# Patient Record
Sex: MALE | Race: WHITE | Employment: FULL TIME | URBAN - METROPOLITAN AREA
[De-identification: names, ages, dates, MRNs, and addresses within clinical notes are randomized per-mention and may not be internally consistent; named-entity substitution may affect disease eponyms.]

---

## 2018-01-07 ENCOUNTER — APPOINTMENT (OUTPATIENT)
Dept: GENERAL RADIOLOGY | Age: 78
DRG: 470 | End: 2018-01-07
Attending: PHYSICIAN ASSISTANT
Payer: COMMERCIAL

## 2018-01-07 ENCOUNTER — HOSPITAL ENCOUNTER (INPATIENT)
Age: 78
LOS: 4 days | Discharge: NURSING, REHAB OR LONG-TERM CARE | DRG: 470 | End: 2018-01-11
Attending: EMERGENCY MEDICINE | Admitting: FAMILY MEDICINE
Payer: COMMERCIAL
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
## 2018-01-07 DIAGNOSIS — S72.001A CLOSED FRACTURE OF NECK OF RIGHT FEMUR, INITIAL ENCOUNTER (HCC): Primary | ICD-10-CM

## 2018-01-07 PROBLEM — F17.200 CURRENT SMOKER: Status: ACTIVE | Noted: 2018-01-07

## 2018-01-07 LAB
ABO + RH BLD: NORMAL
ANION GAP SERPL CALC-SCNC: 7 MMOL/L (ref 3–18)
APPEARANCE UR: CLEAR
BACTERIA URNS QL MICRO: ABNORMAL /HPF
BASOPHILS # BLD: 0 K/UL (ref 0–0.06)
BASOPHILS NFR BLD: 0 % (ref 0–2)
BILIRUB UR QL: NEGATIVE
BLOOD GROUP ANTIBODIES SERPL: NORMAL
BUN SERPL-MCNC: 22 MG/DL (ref 7–18)
BUN/CREAT SERPL: 26 (ref 12–20)
CALCIUM SERPL-MCNC: 9 MG/DL (ref 8.5–10.1)
CHLORIDE SERPL-SCNC: 108 MMOL/L (ref 100–108)
CO2 SERPL-SCNC: 25 MMOL/L (ref 21–32)
COLOR UR: YELLOW
CREAT SERPL-MCNC: 0.84 MG/DL (ref 0.6–1.3)
DIFFERENTIAL METHOD BLD: ABNORMAL
EOSINOPHIL # BLD: 0.1 K/UL (ref 0–0.4)
EOSINOPHIL NFR BLD: 1 % (ref 0–5)
EPITH CASTS URNS QL MICRO: ABNORMAL /LPF (ref 0–5)
ERYTHROCYTE [DISTWIDTH] IN BLOOD BY AUTOMATED COUNT: 14.6 % (ref 11.6–14.5)
GLUCOSE BLD STRIP.AUTO-MCNC: 94 MG/DL (ref 70–110)
GLUCOSE SERPL-MCNC: 100 MG/DL (ref 74–99)
GLUCOSE UR STRIP.AUTO-MCNC: NEGATIVE MG/DL
HCT VFR BLD AUTO: 43.1 % (ref 36–48)
HGB BLD-MCNC: 14.6 G/DL (ref 13–16)
HGB UR QL STRIP: ABNORMAL
INR PPP: 0.9 (ref 0.8–1.2)
KETONES UR QL STRIP.AUTO: ABNORMAL MG/DL
LEUKOCYTE ESTERASE UR QL STRIP.AUTO: ABNORMAL
LYMPHOCYTES # BLD: 1.2 K/UL (ref 0.9–3.6)
LYMPHOCYTES NFR BLD: 13 % (ref 21–52)
MCH RBC QN AUTO: 30.7 PG (ref 24–34)
MCHC RBC AUTO-ENTMCNC: 33.9 G/DL (ref 31–37)
MCV RBC AUTO: 90.7 FL (ref 74–97)
MONOCYTES # BLD: 0.5 K/UL (ref 0.05–1.2)
MONOCYTES NFR BLD: 6 % (ref 3–10)
NEUTS SEG # BLD: 7.5 K/UL (ref 1.8–8)
NEUTS SEG NFR BLD: 80 % (ref 40–73)
NITRITE UR QL STRIP.AUTO: NEGATIVE
PH UR STRIP: 6 [PH] (ref 5–8)
PLATELET # BLD AUTO: 255 K/UL (ref 135–420)
PMV BLD AUTO: 10.8 FL (ref 9.2–11.8)
POTASSIUM SERPL-SCNC: 4.8 MMOL/L (ref 3.5–5.5)
PROT UR STRIP-MCNC: NEGATIVE MG/DL
PROTHROMBIN TIME: 12 SEC (ref 11.5–15.2)
RBC # BLD AUTO: 4.75 M/UL (ref 4.7–5.5)
RBC #/AREA URNS HPF: ABNORMAL /HPF (ref 0–5)
SODIUM SERPL-SCNC: 140 MMOL/L (ref 136–145)
SP GR UR REFRACTOMETRY: 1.02 (ref 1–1.03)
SPECIMEN EXP DATE BLD: NORMAL
UROBILINOGEN UR QL STRIP.AUTO: 0.2 EU/DL (ref 0.2–1)
WBC # BLD AUTO: 9.4 K/UL (ref 4.6–13.2)
WBC URNS QL MICRO: ABNORMAL /HPF (ref 0–4)

## 2018-01-07 PROCEDURE — 94761 N-INVAS EAR/PLS OXIMETRY MLT: CPT

## 2018-01-07 PROCEDURE — 74011000250 HC RX REV CODE- 250: Performed by: FAMILY MEDICINE

## 2018-01-07 PROCEDURE — 86901 BLOOD TYPING SEROLOGIC RH(D): CPT | Performed by: PHYSICIAN ASSISTANT

## 2018-01-07 PROCEDURE — 65270000029 HC RM PRIVATE

## 2018-01-07 PROCEDURE — 81001 URINALYSIS AUTO W/SCOPE: CPT | Performed by: FAMILY MEDICINE

## 2018-01-07 PROCEDURE — 85025 COMPLETE CBC W/AUTO DIFF WBC: CPT | Performed by: PHYSICIAN ASSISTANT

## 2018-01-07 PROCEDURE — 74011250636 HC RX REV CODE- 250/636: Performed by: FAMILY MEDICINE

## 2018-01-07 PROCEDURE — 73502 X-RAY EXAM HIP UNI 2-3 VIEWS: CPT

## 2018-01-07 PROCEDURE — 77030032490 HC SLV COMPR SCD KNE COVD -B

## 2018-01-07 PROCEDURE — 77030034849

## 2018-01-07 PROCEDURE — 80048 BASIC METABOLIC PNL TOTAL CA: CPT | Performed by: PHYSICIAN ASSISTANT

## 2018-01-07 PROCEDURE — C9113 INJ PANTOPRAZOLE SODIUM, VIA: HCPCS | Performed by: FAMILY MEDICINE

## 2018-01-07 PROCEDURE — 71045 X-RAY EXAM CHEST 1 VIEW: CPT

## 2018-01-07 PROCEDURE — 77030020263 HC SOL INJ SOD CL0.9% LFCR 1000ML

## 2018-01-07 PROCEDURE — 82962 GLUCOSE BLOOD TEST: CPT

## 2018-01-07 PROCEDURE — 85610 PROTHROMBIN TIME: CPT | Performed by: FAMILY MEDICINE

## 2018-01-07 PROCEDURE — 93005 ELECTROCARDIOGRAM TRACING: CPT

## 2018-01-07 PROCEDURE — 99285 EMERGENCY DEPT VISIT HI MDM: CPT

## 2018-01-07 RX ORDER — MORPHINE SULFATE 10 MG/ML
4 INJECTION, SOLUTION INTRAMUSCULAR; INTRAVENOUS
Status: DISCONTINUED | OUTPATIENT
Start: 2018-01-07 | End: 2018-01-08

## 2018-01-07 RX ORDER — NALOXONE HYDROCHLORIDE 0.4 MG/ML
0.4 INJECTION, SOLUTION INTRAMUSCULAR; INTRAVENOUS; SUBCUTANEOUS AS NEEDED
Status: DISCONTINUED | OUTPATIENT
Start: 2018-01-07 | End: 2018-01-11 | Stop reason: HOSPADM

## 2018-01-07 RX ORDER — SODIUM CHLORIDE 9 MG/ML
100 INJECTION, SOLUTION INTRAVENOUS CONTINUOUS
Status: DISCONTINUED | OUTPATIENT
Start: 2018-01-07 | End: 2018-01-11 | Stop reason: HOSPADM

## 2018-01-07 RX ADMIN — MORPHINE SULFATE 4 MG: 10 INJECTION INTRAMUSCULAR; INTRAVENOUS; SUBCUTANEOUS at 21:41

## 2018-01-07 RX ADMIN — SODIUM CHLORIDE 40 MG: 9 INJECTION, SOLUTION INTRAMUSCULAR; INTRAVENOUS; SUBCUTANEOUS at 23:27

## 2018-01-07 RX ADMIN — SODIUM CHLORIDE 100 ML/HR: 900 INJECTION, SOLUTION INTRAVENOUS at 21:32

## 2018-01-07 NOTE — IP AVS SNAPSHOT
Romana Halo 
 
 
 [address] Patient: Tan Jiménez MRN: [id number] About your hospitalization You were admitted on:  January 7, 2018 You last received care in the:  [facility] [facility] You were discharged on:  January 11, 2018 Why you were hospitalized Your primary diagnosis was:  Hip Fracture, Right (Hcc) Your diagnoses also included:  Current Smoker Follow-up Information Follow up With Details Comments Contact Info Sunil Hartley MD On 2/6/2018 9am [facility] [address] [id number] 
[phone] Yash Wick MD In 1 month for follow up, sutures out in 10-14 days [facility] [address] [facility] [address] 
[phone] Your Scheduled Appointments Tuesday February 06, 2018  9:00 AM EST New Patient with Sunil Hartley MD  
[facility] [address] [id number]  
[phone] Discharge Orders None A check jennifer indicates which time of day the medication should be taken. My Medications START taking these medications Instructions Each Dose to Equal  
 Morning Noon Evening Bedtime  
 enoxaparin 40 mg/0.4 mL Commonly known as:  LOVENOX Your last dose was: Your next dose is: 0.4 mL by SubCUTAneous route daily. 40 mg  
    
   
   
   
  
 nicotine 14 mg/24 hr patch Commonly known as:  Parag Hashimoto Start taking on:  1/12/2018 Your last dose was: Your next dose is:    
   
   
 1 Patch by TransDERmal route daily for 30 days. 1 Patch  
    
   
   
   
  
 oxyCODONE-acetaminophen 5-325 mg per tablet Commonly known as:  PERCOCET Your last dose was: Your next dose is: Take 1-2 Tabs by mouth every four (4) hours as needed. Max Daily Amount: 12 Tabs. 1-2 Tab Where to Get Your Medications Information on where to get these meds will be given to you by the nurse or doctor. ! Ask your nurse or doctor about these medications  
  enoxaparin 40 mg/0.4 mL  
 nicotine 14 mg/24 hr patch  
 oxyCODONE-acetaminophen 5-325 mg per tablet Discharge Instructions Surgery to Repair a Hip Fracture: What to Expect at Home Your Recovery Surgery for a hip fracture repairs a broken hip bone. When you leave the hospital after surgery, you will probably be walking with crutches or a walker. You may be able to climb a few stairs and get in and out of bed and chairs. But you will need someone to help you at home for the next few weeks or until you have more energy and can move around better. If there is no one to help you at home, you may go to a rehabilitation center or long-term care center. You will go home with a bandage and stitches or staples. You can remove the bandage when your doctor tells you to. Your doctor will remove your stitches or staples 10 days to 3 weeks after your surgery. You may still have some mild pain, and the area may be swollen for 3 to 4 months after surgery. Your doctor will give you medicine for the pain. You will continue the rehabilitation program (rehab) you started in the hospital. The better you do with your rehab exercises, the quicker you will get your strength and movement back. Most people are able to return to work 4 weeks to 4 months after surgery. But it may take 6 months to 1 year for you to fully recover. Some people, especially older people, are never able to move quite as well as they used to. You heal best when you take good care of yourself. Eat a variety of healthy foods, and don't smoke. This care sheet gives you a general idea about how long it will take for you to recover. But each person recovers at a different pace. Follow the steps below to get better as quickly as possible. How can you care for yourself at home? Activity ? · Rest when you feel tired. You may take a nap, but do not stay in bed all day. ? · Work with your physical therapist to learn the best way to exercise. You may be able to take frequent, short walks using crutches or a walker. You will probably have to use crutches or a walker for at least 4 to 6 weeks. After that, you may need to use a cane to help you walk. ? · Do not sit for longer than 30 to 45 minutes at a time. When you sit, use chairs with arms, and do not sit in low chairs. ? · Sleep on your back with your legs slightly apart or on your side with a pillow between your knees for about 6 weeks or as your doctor tells you. Do not sleep on your stomach or affected hip. ? · You may need to take sponge baths until your stitches or staples have been removed. You will probably be able to shower 24 hours after they are removed. Ask your doctor when it is okay to bathe or shower. ? · Ask your doctor when you can drive again. ? · Most people are able to return to work 4 weeks to 4 months after surgery. ? · Ask your doctor when it is okay for you to have sex. ? · Do not lift anything that would make you strain. This may include heavy grocery bags and milk containers, a heavy briefcase or backpack, cat litter or dog food bags, a vacuum , or a child. Diet ? · By the time you leave the hospital, you will probably be eating your normal diet. If your stomach is upset, try bland, low-fat foods like plain rice, broiled chicken, toast, and yogurt. Your doctor may recommend that you take iron and vitamin supplements. ? · Continue to drink plenty of fluids. ? · Eat healthy foods, and watch your portion sizes. Try to stay at your ideal weight. Too much weight puts more stress on your hip joint. ? · You may notice that your bowel movements are not regular right after your surgery. This is common.  Try to avoid constipation and straining with bowel movements. You may want to take a fiber supplement every day. If you have not had a bowel movement after a couple of days, ask your doctor about taking a mild laxative. ? · Your doctor may want you to take calcium supplements and eat foods high in calcium, such as milk, cheese, ice cream, and salmon with bones. These help stop bone loss. Orange juice and soy milk with added calcium are also good choices. Medicines ? · Your doctor will tell you if and when you can restart your medicines. He or she will also give you instructions about taking any new medicines. ? · If you take blood thinners, such as warfarin (Coumadin), clopidogrel (Plavix), or aspirin, be sure to talk to your doctor. He or she will tell you if and when to start taking those medicines again. Make sure that you understand exactly what your doctor wants you to do.  
? · Your doctor may give you a blood-thinning medicine to prevent blood clots. If you take a blood thinner, be sure you get instructions about how to take your medicine safely. Blood thinners can cause serious bleeding problems. This medicine could be in pill form or as a shot (injection). If a shot is necessary, your doctor will tell you how to do this. ? · Be safe with medicines. Take pain medicines exactly as directed. ¨ If the doctor gave you a prescription medicine for pain, take it as prescribed. ¨ If you are not taking a prescription pain medicine, ask your doctor if you can take an over-the-counter medicine. ? · If you think your pain medicine is making you sick to your stomach: 
¨ Take your medicine after meals (unless your doctor has told you not to). ¨ Ask your doctor for a different pain medicine. ? · If your doctor prescribed antibiotics, take them as directed. Do not stop taking them just because you feel better. You need to take the full course of antibiotics.   
? · Your doctor may also prescribe medicines or calcium supplements to make your bones stronger. Incision care ? · You will have a bandage over the cut (incision) and staples or stitches. If there is no drainage, most doctors will let you take the bandage off in a few days. ? · Your doctor will remove the staples or stitches 10 days to 3 weeks after the surgery and replace them with strips of tape. Leave the tape on for a week or until it falls off. Exercise ? · Your rehab program will include a number of exercises to do. Always do them as your therapist tells you. ? · Do not do any vigorous exercise for 12 weeks or until your doctor tells you it is okay. Ice and elevation ? · For pain, put ice or a cold pack on the area for 10 to 20 minutes at a time. Put a thin cloth between the ice and your skin. ? · Your ankle may swell for about 3 months. Prop up your ankle when you ice it or anytime you sit or lie down. Try to keep it above the level of your heart. This will help reduce swelling. Other instructions ? · Continue to wear your support stockings as your doctor says. These help to prevent blood clots. The length of time that you will have to wear them depends on your activity level and the amount of swelling you have. Most people wear these stockings for 4 to 6 weeks after surgery. ?Preventing falls is also very important. To prevent falls: 
? · Arrange furniture so that you will not trip on it. ? · Get rid of throw rugs, and move electrical cords out of the way. ? · Walk only in areas with plenty of light. ? · Put grab bars in showers and bathtubs. ? · Avoid icy or snowy sidewalks. ? · Wear shoes with sturdy, flat soles. Follow-up care is a key part of your treatment and safety. Be sure to make and go to all appointments, and call your doctor if you are having problems. It's also a good idea to know your test results and keep a list of the medicines you take. When should you call for help? Call 911 anytime you think you may need emergency care. For example, call if: 
? · You passed out (lost consciousness). ? · You have severe trouble breathing. ? · You have sudden chest pain and shortness of breath, or you cough up blood. ?Call your doctor now or seek immediate medical care if: 
? · Your leg or foot is cool or pale or changes color. ? · You cannot feel or move your leg. ? · You have signs of a blood clot, such as: 
¨ Pain in your calf, back of the knee, thigh, or groin. ¨ Redness and swelling in your leg or groin. ? · Your incision comes open and begins to bleed, or the bleeding increases. ? · You feel like your heart is racing or beating irregularly. ? · You have signs of infection, such as: 
¨ Increased pain, swelling, warmth, or redness. ¨ Red streaks leading from the incision. ¨ Pus draining from the incision. ¨ A fever. ? Watch closely for any changes in your health, and be sure to contact your doctor if: 
? · You do not have a bowel movement after taking a laxative. ? · You do not get better as expected. Where can you learn more? Go to http://chacha-ludmila.info/. Enter T523 in the search box to learn more about \"Surgery to Repair a Hip Fracture: What to Expect at Home. \" Current as of: March 21, 2017 Content Version: 11.4 © 0670-2968 [employer]. Care instructions adapted under license by [employer] (which disclaims liability or warranty for this information). If you have questions about a medical condition or this instruction, always ask your healthcare professional. [place] [address] any warranty or liability for your use of this information. [employer] Announcement We are excited to announce that we are making your provider's discharge notes available to you in [employer].   You will see these notes when they are completed and signed by the physician that discharged you from your recent hospital stay. If you have any questions or concerns about any information you see in [employer], please call the Health Information Department where you were seen or reach out to your Primary Care Provider for more information about your plan of care. Introducing [facility]! Mickie Farfan introduces [employer] patient portal. Now you can access parts of your medical record, email your doctor's office, and request medication refills online. 1. In your internet browser, go to [email or web address] 2. Click on the First Time User? Click Here link in the Sign In box. You will see the New Member Sign Up page. 3. Enter your [employer] Access Code exactly as it appears below. You will not need to use this code after youve completed the sign-up process. If you do not sign up before the expiration date, you must request a new code. · [employer] Access Code: [email or web address] Expires: 4/8/2018  4:10 PM 
 
4. Enter the last four digits of your Social Security Number (xxxx) and Date of Birth (mm/dd/yyyy) as indicated and click Submit. You will be taken to the next sign-up page. 5. Create a Second [employer] Fourth ID. This will be your [employer] login ID and cannot be changed, so think of one that is secure and easy to remember. 6. Create a [employer] password. You can change your password at any time. 7. Enter your Password Reset Question and Answer. This can be used at a later time if you forget your password. 8. Enter your e-mail address. You will receive e-mail notification when new information is available in [address] 9. Click Sign Up. You can now view and download portions of your medical record. 10. Click the Download Summary menu link to download a portable copy of your medical information. If you have questions, please visit the Frequently Asked Questions section of the [employer] website. Remember, [employer] is NOT to be used for urgent needs. For medical emergencies, dial 911. Now available from your iPhone and Android! Providers Seen During Your Hospitalization Provider Specialty Primary office phone Pierce Rodriguez DO Emergency Medicine [phone] Mallory Bell MD Emergency Medicine [phone] Saige Enriquez MD [facility] [phone] Jenn Marie MD Internal Medicine [phone] Marcie Combs MD Internal Medicine [phone] Cookie Iglesias DO Internal Medicine [phone] Your Primary Care Physician (PCP) Primary Care Physician Office Phone Office Fax [facility] [phone] You are allergic to the following Allergen Reactions Pcn (Penicillins) Rash  
    
 Sulfa (Sulfonamide Antibiotics) Rash Recent Documentation Height Weight BMI Smoking Status 1.778 m 63.5 kg 20.09 kg/m2 Current Every Day Smoker Emergency Contacts Name Discharge Info Relation Home Work Mobile PinedaLinnea DISCHARGE CAREGIVER [3] Other Relative [6] [phone] Patient Belongings The following personal items are in your possession at time of discharge: 
  Dental Appliances: None  Visual Aid: Glasses, At home      Home Medications: None   Jewelry: None  Clothing: At bedside, Jacket/Coat, Shirt, Footwear    Other Valuables: Donnell Martinez Discharge Instructions Attachments/References OXYCODONE/ACETAMINOPHEN (BY MOUTH) (ENGLISH) ENOXAPARIN (LOVENOX) (ENGLISH) NICOTINE (ABSORBED THROUGH THE SKIN) (ENGLISH) Patient Handouts Oxycodone/Acetaminophen (By mouth) Acetaminophen (v-qald-p-MIN-oh-fen), Oxycodone Hydrochloride (nz-w-PFA-done jose-droe-KLOR-ramses) Treats moderate to moderately severe pain. This medicine is a narcotic pain reliever. Brand Name(s): Endocet, Percocet, Primlev, Xartemis XR There may be other brand names for this medicine. When This Medicine Should Not Be Used: This medicine is not right for everyone. Do not use it if you had an allergic reaction to acetaminophen or oxycodone, or if you have serious breathing problems or paralytic ileus. How to Use This Medicine:  
Capsule, Liquid, Tablet, Long Acting Tablet · Your doctor will tell you how much medicine to use. Do not use more than directed. · An overdose can be dangerous. Follow directions carefully so you do not get too much medicine at one time. · Oral liquid: Measure the oral liquid medicine with a marked measuring spoon, oral syringe, or medicine cup. · Swallow the extended-release tablet whole. Do not crush, break, or chew it. Do not lick or wet the tablet before placing it in your mouth. Do not give this medicine through a feeding tube. · This medicine should come with a Medication Guide. Ask your pharmacist for a copy if you do not have one. · Missed dose: If you miss a dose of this medicine, skip the missed dose and go back to your regular dosing schedule. Do not double doses. · Store the medicine in a closed container at room temperature, away from heat, moisture, and direct light. Ask your pharmacist about the best way to dispose of medicine you do not use. Drugs and Foods to Avoid: Ask your doctor or pharmacist before using any other medicine, including over-the-counter medicines, vitamins, and herbal products. · Do not use Xartemis XR if you are using or have used an MAO inhibitor in the past 14 days. · Some medicines can affect how this medicine works. Tell your doctor if you are using any of the following: ¨ Carbamazepine, erythromycin, ketoconazole, lamotrigine, mirtazapine, naltrexone, phenytoin, propranolol, rifampin, ritonavir, tramadol, trazodone, or zidovudine ¨ Birth control pills ¨ Diuretic (water pill) ¨ Medicine to treat depression ¨ Phenothiazine medicine ¨ Triptan medicine to treat migraine headaches · Do not drink alcohol while you are using this medicine.  Acetaminophen can damage your liver, and alcohol can increase this risk. Do not take acetaminophen without asking your doctor if you have 3 or more drinks of alcohol every day. · Tell your doctor if you use anything else that makes you sleepy. Some examples are allergy medicine, narcotic pain medicine, and alcohol. Tell your doctor if you are using buprenorphine, butorphanol, nalbuphine, pentazocine, a benzodiazepine, or a muscle relaxer. Warnings While Using This Medicine: · Tell your doctor if you are pregnant or breastfeeding, or if you have kidney disease, liver disease, heart disease, low blood pressure, breathing problems or lung disease (such as asthma, COPD), thyroid problems, Frederick disease, pancreas or gallbladder problems, prostate problems, trouble urinating, or a stomach problems, or a history of head injury or brain damage, seizures, or alcohol or drug abuse. Tell your doctor if you are allergic to codeine. · This medicine may cause the following problems: 
¨ High risk of overdose, which can lead to death ¨ Respiratory depression (serious breathing problem that can be life-threatening) ¨ Liver problems ¨ Serious skin reactions ¨ Serotonin syndrome (when used with certain medicines) · This medicine may make you dizzy or drowsy. Do not drive or do anything that could be dangerous until you know how this medicine affects you. Sit or lie down if you feel dizzy. Stand up carefully. · This medicine contains acetaminophen. Read the labels of all other medicines you are using to see if they also contain acetaminophen, or ask your doctor or pharmacist. Margarita Pitch not use more than 4 grams (4,000 milligrams) total of acetaminophen in one day. · This medicine can be habit-forming. Do not use more than your prescribed dose. Call your doctor if you think your medicine is not working. · Do not stop using this medicine suddenly. Your doctor will need to slowly decrease your dose before you stop it completely. · This medicine could cause infertility. Talk with your doctor before using this medicine if you plan to have children. · This medicine may cause constipation, especially with long-term use. Ask your doctor if you should use a laxative to prevent and treat constipation. · Keep all medicine out of the reach of children. Never share your medicine with anyone. Possible Side Effects While Using This Medicine:  
Call your doctor right away if you notice any of these side effects: · Allergic reaction: Itching or hives, swelling in your face or hands, swelling or tingling in your mouth or throat, chest tightness, trouble breathing · Anxiety, restlessness, fast heartbeat, fever, muscle spasms, twitching, diarrhea, seeing or hearing things that are not there · Blistering, peeling, red skin rash · Blue lips, fingernails, or skin · Dark urine or pale stools, loss of appetite, stomach pain, yellow skin or eyes · Extreme weakness, shallow breathing, uneven heartbeat, seizures, sweating, or cold or clammy skin · Severe confusion, lightheadedness, dizziness, or fainting · Severe constipation, nausea, or vomiting · Trouble breathing or slow breathing If you notice these less serious side effects, talk with your doctor:  
· Headache · Mild constipation, nausea, or vomiting · Mild sleepiness or drowsiness If you notice other side effects that you think are caused by this medicine, tell your doctor. Call your doctor for medical advice about side effects. You may report side effects to FDA at 1-539-FDA-9711 © 2017 [address] Information is for End User's use only and may not be sold, redistributed or otherwise used for commercial purposes. The above information is an  only. It is not intended as medical advice for individual conditions or treatments. Talk to your doctor, nurse or pharmacist before following any medical regimen to see if it is safe and effective for you. Enoxaparin (Lovenox): Care Instructions Your Care Instructions Enoxaparin (Lovenox) is an anticoagulant medicine. It is one of a class of anticoagulants called low molecular weight heparin. Many people call these medicines blood thinners. They don't actually thin the blood, but they increase the time it takes a blood clot to form. This reduces the chance of a blood clot in the leg veins (deep vein thrombosis) or in the lungs (pulmonary embolism). Enoxaparin is a shot (injection). You or someone caring for you will inject it once or twice a day. Most people need shots for 5 to 10 days, but in some cases it can be longer. Your doctor will tell you how long you need to have the shots. Enoxaparin is used to: · Treat deep vein thrombosis (DVT), which is a blood clot in the legs, pelvis, or arms. · Reduce the chance of getting blood clots after certain surgeries. For example, you may take enoxaparin after knee or hip replacement surgery. · Reduce the chance of getting blood clots in people who are likely to get them and who are not active for a long period of time. For example, you may need enoxaparin if you need to stay in bed for a long time because of a health problem. · Reduce the chance of blood clots when another blood thinner is stopped for a short time. For example, if you take warfarin and need surgery, your doctor may ask you to stop taking warfarin for a short time before the surgery. You will take enoxaparin to help prevent blood clots before the surgery. After the surgery, your doctor will tell you when it is safe to start taking warfarin again. This is called bridge therapy. Follow-up care is a key part of your treatment and safety. Be sure to make and go to all appointments, and call your doctor if you are having problems. It's also a good idea to know your test results and keep a list of the medicines you take. How can you care for yourself at home? How to inject enoxaparin You will get a prescription for prefilled syringes. Inject the medicine at the same time every day unless your doctor gives you other instructions. 1. Wash and dry your hands. 2. Sit or lie in a position that lets you see your belly. 3. Clean the injection site with an alcohol pad or swab, and let it dry. Choose a site on the right or left side of your belly, at least 2 inches from your belly button. Change the site each time you inject the medicine. 4. Remove the needle cap by pulling it straight off. Don't twist it. 5. Hold the syringe like a pencil in one hand. With the other hand, pinch an area of the injection site skin. You should have a \"fold\" in the skin. 6. Insert the entire needle straight down into the fold of skin. Don't insert the needle at an angle. 7. Press the plunger with your thumb until the syringe is empty. 8. Pull the needle straight out and let go of the skin. 9. Point the needle away from you and press down on the plunger. The needle will be covered. Take precautions · Don't rub the injection site. This could cause bruising. · Don't push air bubbles out of the syringe unless your doctor tells you to. Each syringe comes with air bubbles. · Don't stop taking enoxaparin without talking to your doctor. · If you are taking a blood thinner, be sure you get instructions about how to take your medicine safely. Blood thinners can cause serious bleeding problems. · Talk to your doctor before you take any prescription medicines, over-the-counter medicines, antibiotics, vitamins, or herbal products. · Don't take the following medicines unless your doctor says it's okay: ¨ Aspirin, products like aspirin (salicylates), or products that contain aspirin ¨ Nonsteroidal anti-inflammatory drugs (NSAIDs), such as ibuprofen (Advil, Motrin) and naproxen (Aleve) · Store enoxaparin at room temperature. Don't put it in the refrigerator or freezer. When should you call for help? Call 911 anytime you think you may need emergency care. For example, call if: 
? · You passed out (lost consciousness). ? · You have signs of severe bleeding, such as: ¨ A severe headache that is different from past headaches. ¨ Vomiting blood or what looks like coffee grounds. ¨ Passing maroon or very bloody stools. ?Call your doctor now or seek immediate medical care if: 
? · You have unexpected bleeding, including: ¨ Blood in stools or black stools that look like tar. ¨ Blood in your urine. ¨ Bruises or blood spots under the skin. ? · You feel dizzy or lightheaded. ? Watch closely for changes in your health, and be sure to contact your doctor if: 
? · You do not get better as expected. Where can you learn more? Go to http://chacha-ludmila.info/. Enter P266 in the search box to learn more about \"Enoxaparin (Lovenox): Care Instructions. \" Current as of: September 21, 2016 Content Version: 11.4 © 0710-3687 [employer]. Care instructions adapted under license by [employer] (which disclaims liability or warranty for this information). If you have questions about a medical condition or this instruction, always ask your healthcare professional. [place] [address] any warranty or liability for your use of this information. Nicotine (Absorbed through the skin) Nicotine (LUZ-oh-teen) Helps you quit smoking. Brand Name(s): [employer]ain Nicotine Transdermal System, [employer]ger Nicotine Transdermal System, Leader Nicotine Transdermal System, Nicoderm CQ, Nicoderm CQ Clear, Nicotrol, [facility] Nicotine, Sunmark Nicotine Transdermal System There may be other brand names for this medicine. When This Medicine Should Not Be Used: This medicine is not right for everyone. Do not use it if you had an allergic reaction to nicotine. How to Use This Medicine:  
Patch · Follow the instructions on the medicine label if you are using this medicine without a prescription. · Read and follow the patient instructions that come with this medicine. Talk to your doctor or pharmacist if you have any questions. · Wash your hands with soap and water before and after applying a patch. · Leave the patch in its sealed wrapper until you are ready to put it on. Tear the wrapper open carefully. NEVER CUT the wrapper or the patch with scissors. Do not use any patch that has been cut by accident. · NicoDerm® CQ:  
¨ This is a 3-step program. If you smoke more than 10 cigarettes per day, start with step 1 followed by step 2 and step 3. If you smoke 10 or fewer cigarettes per day, start with step 2 followed by step 3. ¨ Begin using the patch on the morning of your quit day, even if you are not able to stop smoking immediately. ¨ Apply the patch at about the same time every day to skin that is clean, dry, and free of hair. ¨ The patient instructions will show the body areas where you can wear the patch. When putting on each new patch, choose a different place within these areas. Do not put the new patch on the same place you wore the last one. Be sure to remove the old patch before applying a new one. ¨ Do not put the patch over irritated skin. Do not use creams or lotions, including sunscreen, on the skin where you apply the patch, because it may not stick well. ¨ Apply a new patch if one falls off. ¨ If you have vivid dreams or sleep problems, remove the patch at bedtime and apply a new one in the morning. · Keep using this medicine for the full treatment time. If you feel you need to use this medicine for a longer period of time, talk to your doctor. · Store the patches at room temperature in a closed container, away from heat, moisture, and direct light. · Fold the used patch in half with the sticky sides together. Throw any used patch away so that children or pets cannot get to it.  You will also need to throw away old patches after the expiration date has passed. Drugs and Foods to Avoid: Ask your doctor or pharmacist before using any other medicine, including over-the-counter medicines, vitamins, and herbal products. Warnings While Using This Medicine: · Pregnant or breastfeeding women should only use this medicine as directed by a doctor. Smoking can seriously harm your unborn child. Try to stop smoking without using medicine. Although this medicine is believed to be safer than smoking, the risks of its use during pregnancy are not fully known. · Tell your doctor if you have heart disease, heart rhythm problems, high blood pressure, or you had a recent heart attack. Tell your doctor if you have an allergy to adhesive tape. · This medicine may cause the following problems: 
¨ High blood pressure ¨ Increase in heart rate · The opaque NicoDerm® CQ patch may cause skin burns if you have a procedure called a magnetic resonance imaging (MRI) scan. You must remove the patch before an MRI. · Keep all medicine out of the reach of children. Never share your medicine with anyone. Possible Side Effects While Using This Medicine:  
Call your doctor right away if you notice any of these side effects: · Allergic reaction: Itching or hives, swelling in your face or hands, swelling or tingling in your mouth or throat, chest tightness, trouble breathing · Dizziness, headache, upset stomach, drooling, vomiting, diarrhea, cold sweats, blurred vision, trouble hearing, confusion, fainting, or weakness · Fast, slow, pounding, or uneven heartbeat If you notice these less serious side effects, talk with your doctor: · Mild skin redness, itching, burning, or tingling where you wear the patch · Vivid dreams or trouble sleeping If you notice other side effects that you think are caused by this medicine, tell your doctor. Call your doctor for medical advice about side effects.  You may report side effects to FDA at 1-800-FDA-1088 © 2017 [address] Information is for End User's use only and may not be sold, redistributed or otherwise used for commercial purposes. The above information is an  only. It is not intended as medical advice for individual conditions or treatments. Talk to your doctor, nurse or pharmacist before following any medical regimen to see if it is safe and effective for you. Please provide this summary of care documentation to your next provider. Signatures-by signing, you are acknowledging that this After Visit Summary has been reviewed with you and you have received a copy. Patient Signature:  ____________________________________________________________ Date:  ____________________________________________________________  
  
Devota Dandy Provider Signature:  ____________________________________________________________ Date:  ____________________________________________________________

## 2018-01-08 ENCOUNTER — ANESTHESIA EVENT (OUTPATIENT)
Dept: SURGERY | Age: 78
DRG: 470 | End: 2018-01-08
Payer: COMMERCIAL

## 2018-01-08 ENCOUNTER — ANESTHESIA (OUTPATIENT)
Dept: SURGERY | Age: 78
DRG: 470 | End: 2018-01-08
Payer: COMMERCIAL

## 2018-01-08 LAB
ANION GAP SERPL CALC-SCNC: 11 MMOL/L (ref 3–18)
ATRIAL RATE: 71 BPM
BASOPHILS # BLD: 0 K/UL (ref 0–0.06)
BASOPHILS NFR BLD: 0 % (ref 0–2)
BUN SERPL-MCNC: 15 MG/DL (ref 7–18)
BUN SERPL-MCNC: 18 MG/DL (ref 7–18)
BUN/CREAT SERPL: 25 (ref 12–20)
CALCIUM SERPL-MCNC: 8.6 MG/DL (ref 8.5–10.1)
CALCULATED P AXIS, ECG09: 77 DEGREES
CALCULATED R AXIS, ECG10: 77 DEGREES
CALCULATED T AXIS, ECG11: 78 DEGREES
CHLORIDE SERPL-SCNC: 107 MMOL/L (ref 100–108)
CO2 SERPL-SCNC: 23 MMOL/L (ref 21–32)
CREAT SERPL-MCNC: 0.73 MG/DL (ref 0.6–1.3)
DIAGNOSIS, 93000: NORMAL
DIFFERENTIAL METHOD BLD: ABNORMAL
EOSINOPHIL # BLD: 0 K/UL (ref 0–0.4)
EOSINOPHIL NFR BLD: 0 % (ref 0–5)
ERYTHROCYTE [DISTWIDTH] IN BLOOD BY AUTOMATED COUNT: 14.7 % (ref 11.6–14.5)
ERYTHROCYTE [DISTWIDTH] IN BLOOD BY AUTOMATED COUNT: 15.1 % (ref 11.6–14.5)
GLUCOSE SERPL-MCNC: 94 MG/DL (ref 74–99)
HCT VFR BLD AUTO: 39.4 % (ref 36–48)
HCT VFR BLD AUTO: 40.5 % (ref 36–48)
HGB BLD-MCNC: 13.1 G/DL (ref 13–16)
HGB BLD-MCNC: 13.7 G/DL (ref 13–16)
LYMPHOCYTES # BLD: 1.2 K/UL (ref 0.9–3.6)
LYMPHOCYTES NFR BLD: 10 % (ref 21–52)
MCH RBC QN AUTO: 30.5 PG (ref 24–34)
MCH RBC QN AUTO: 30.7 PG (ref 24–34)
MCHC RBC AUTO-ENTMCNC: 33.2 G/DL (ref 31–37)
MCHC RBC AUTO-ENTMCNC: 33.8 G/DL (ref 31–37)
MCV RBC AUTO: 90.8 FL (ref 74–97)
MCV RBC AUTO: 91.8 FL (ref 74–97)
MONOCYTES # BLD: 0.9 K/UL (ref 0.05–1.2)
MONOCYTES NFR BLD: 8 % (ref 3–10)
NEUTS SEG # BLD: 9.8 K/UL (ref 1.8–8)
NEUTS SEG NFR BLD: 82 % (ref 40–73)
P-R INTERVAL, ECG05: 146 MS
PLATELET # BLD AUTO: 229 K/UL (ref 135–420)
PLATELET # BLD AUTO: 239 K/UL (ref 135–420)
PMV BLD AUTO: 10.6 FL (ref 9.2–11.8)
PMV BLD AUTO: 10.6 FL (ref 9.2–11.8)
POTASSIUM SERPL-SCNC: 4.2 MMOL/L (ref 3.5–5.5)
Q-T INTERVAL, ECG07: 408 MS
QRS DURATION, ECG06: 84 MS
QTC CALCULATION (BEZET), ECG08: 443 MS
RBC # BLD AUTO: 4.29 M/UL (ref 4.7–5.5)
RBC # BLD AUTO: 4.46 M/UL (ref 4.7–5.5)
SODIUM SERPL-SCNC: 141 MMOL/L (ref 136–145)
VENTRICULAR RATE, ECG03: 71 BPM
WBC # BLD AUTO: 12 K/UL (ref 4.6–13.2)
WBC # BLD AUTO: 13.4 K/UL (ref 4.6–13.2)

## 2018-01-08 PROCEDURE — 74011250636 HC RX REV CODE- 250/636: Performed by: HOSPITALIST

## 2018-01-08 PROCEDURE — 77030008477 HC STYL SATN SLP COVD -A: Performed by: ANESTHESIOLOGY

## 2018-01-08 PROCEDURE — 76010000162 HC OR TIME 1.5 TO 2 HR INTENSV-TIER 1: Performed by: ORTHOPAEDIC SURGERY

## 2018-01-08 PROCEDURE — 84520 ASSAY OF UREA NITROGEN: CPT | Performed by: ORTHOPAEDIC SURGERY

## 2018-01-08 PROCEDURE — 74011250636 HC RX REV CODE- 250/636: Performed by: FAMILY MEDICINE

## 2018-01-08 PROCEDURE — C1776 JOINT DEVICE (IMPLANTABLE): HCPCS | Performed by: ORTHOPAEDIC SURGERY

## 2018-01-08 PROCEDURE — 77030006777 HC BLD SAW OSC CNMD -B: Performed by: ORTHOPAEDIC SURGERY

## 2018-01-08 PROCEDURE — 77030018836 HC SOL IRR NACL ICUM -A: Performed by: ORTHOPAEDIC SURGERY

## 2018-01-08 PROCEDURE — 77030020263 HC SOL INJ SOD CL0.9% LFCR 1000ML

## 2018-01-08 PROCEDURE — 74011250636 HC RX REV CODE- 250/636

## 2018-01-08 PROCEDURE — 85025 COMPLETE CBC W/AUTO DIFF WBC: CPT | Performed by: FAMILY MEDICINE

## 2018-01-08 PROCEDURE — 77030011640 HC PAD GRND REM COVD -A: Performed by: ORTHOPAEDIC SURGERY

## 2018-01-08 PROCEDURE — 74011000250 HC RX REV CODE- 250

## 2018-01-08 PROCEDURE — 74011000272 HC RX REV CODE- 272: Performed by: ORTHOPAEDIC SURGERY

## 2018-01-08 PROCEDURE — 65270000029 HC RM PRIVATE

## 2018-01-08 PROCEDURE — 76210000016 HC OR PH I REC 1 TO 1.5 HR: Performed by: ORTHOPAEDIC SURGERY

## 2018-01-08 PROCEDURE — 77030003029 HC SUT VCRL J&J -B: Performed by: ORTHOPAEDIC SURGERY

## 2018-01-08 PROCEDURE — 77030008462 HC STPLR SKN PROX J&J -A: Performed by: ORTHOPAEDIC SURGERY

## 2018-01-08 PROCEDURE — 74011250637 HC RX REV CODE- 250/637: Performed by: ORTHOPAEDIC SURGERY

## 2018-01-08 PROCEDURE — 77030013079 HC BLNKT BAIR HGGR 3M -A: Performed by: ANESTHESIOLOGY

## 2018-01-08 PROCEDURE — 0SRR0JA REPLACEMENT OF RIGHT HIP JOINT, FEMORAL SURFACE WITH SYNTHETIC SUBSTITUTE, UNCEMENTED, OPEN APPROACH: ICD-10-PCS | Performed by: ORTHOPAEDIC SURGERY

## 2018-01-08 PROCEDURE — 77030002982 HC SUT POLYSRB J&J -A: Performed by: ORTHOPAEDIC SURGERY

## 2018-01-08 PROCEDURE — 80048 BASIC METABOLIC PNL TOTAL CA: CPT | Performed by: FAMILY MEDICINE

## 2018-01-08 PROCEDURE — 77030006822 HC BLD SAW SAG BRSM -B: Performed by: ORTHOPAEDIC SURGERY

## 2018-01-08 PROCEDURE — 74011000250 HC RX REV CODE- 250: Performed by: ORTHOPAEDIC SURGERY

## 2018-01-08 PROCEDURE — 36415 COLL VENOUS BLD VENIPUNCTURE: CPT | Performed by: FAMILY MEDICINE

## 2018-01-08 PROCEDURE — 77030008683 HC TU ET CUF COVD -A: Performed by: ANESTHESIOLOGY

## 2018-01-08 PROCEDURE — 74011000258 HC RX REV CODE- 258

## 2018-01-08 PROCEDURE — 77030002922 HC SUT FBRWRE ARTH -B: Performed by: ORTHOPAEDIC SURGERY

## 2018-01-08 PROCEDURE — 85027 COMPLETE CBC AUTOMATED: CPT | Performed by: ORTHOPAEDIC SURGERY

## 2018-01-08 PROCEDURE — 77030031139 HC SUT VCRL2 J&J -A: Performed by: ORTHOPAEDIC SURGERY

## 2018-01-08 PROCEDURE — 77030020788: Performed by: ORTHOPAEDIC SURGERY

## 2018-01-08 PROCEDURE — 77030013708 HC HNDPC SUC IRR PULS STRY –B: Performed by: ORTHOPAEDIC SURGERY

## 2018-01-08 PROCEDURE — 77030018547 HC SUT ETHBND1 J&J -B: Performed by: ORTHOPAEDIC SURGERY

## 2018-01-08 PROCEDURE — 76060000034 HC ANESTHESIA 1.5 TO 2 HR: Performed by: ORTHOPAEDIC SURGERY

## 2018-01-08 PROCEDURE — 77030032490 HC SLV COMPR SCD KNE COVD -B: Performed by: ORTHOPAEDIC SURGERY

## 2018-01-08 DEVICE — IMPLANTABLE DEVICE
Type: IMPLANTABLE DEVICE | Site: HIP | Status: FUNCTIONAL
Brand: EXACTECH

## 2018-01-08 DEVICE — IMPLANTABLE DEVICE
Type: IMPLANTABLE DEVICE | Site: HIP | Status: FUNCTIONAL
Brand: NOVATION

## 2018-01-08 DEVICE — COMPONENT HIP CMNTLS BPLR HA PREMIER: Type: IMPLANTABLE DEVICE | Site: HIP | Status: FUNCTIONAL

## 2018-01-08 DEVICE — IMPLANTABLE DEVICE
Type: IMPLANTABLE DEVICE | Site: HIP | Status: FUNCTIONAL
Brand: ACUMATCH L-SERIES

## 2018-01-08 RX ORDER — HYDROMORPHONE HYDROCHLORIDE 2 MG/ML
0.5 INJECTION, SOLUTION INTRAMUSCULAR; INTRAVENOUS; SUBCUTANEOUS
Status: DISCONTINUED | OUTPATIENT
Start: 2018-01-08 | End: 2018-01-08

## 2018-01-08 RX ORDER — MORPHINE SULFATE 2 MG/ML
4 INJECTION, SOLUTION INTRAMUSCULAR; INTRAVENOUS
Status: DISCONTINUED | OUTPATIENT
Start: 2018-01-08 | End: 2018-01-08

## 2018-01-08 RX ORDER — OXYCODONE AND ACETAMINOPHEN 5; 325 MG/1; MG/1
1 TABLET ORAL AS NEEDED
Status: DISCONTINUED | OUTPATIENT
Start: 2018-01-08 | End: 2018-01-08 | Stop reason: HOSPADM

## 2018-01-08 RX ORDER — MORPHINE SULFATE 2 MG/ML
2 INJECTION, SOLUTION INTRAMUSCULAR; INTRAVENOUS
Status: DISCONTINUED | OUTPATIENT
Start: 2018-01-08 | End: 2018-01-09

## 2018-01-08 RX ORDER — ONDANSETRON 2 MG/ML
INJECTION INTRAMUSCULAR; INTRAVENOUS AS NEEDED
Status: DISCONTINUED | OUTPATIENT
Start: 2018-01-08 | End: 2018-01-08 | Stop reason: HOSPADM

## 2018-01-08 RX ORDER — MIDAZOLAM HYDROCHLORIDE 1 MG/ML
INJECTION, SOLUTION INTRAMUSCULAR; INTRAVENOUS AS NEEDED
Status: DISCONTINUED | OUTPATIENT
Start: 2018-01-08 | End: 2018-01-08 | Stop reason: HOSPADM

## 2018-01-08 RX ORDER — SUCCINYLCHOLINE CHLORIDE 20 MG/ML
INJECTION INTRAMUSCULAR; INTRAVENOUS AS NEEDED
Status: DISCONTINUED | OUTPATIENT
Start: 2018-01-08 | End: 2018-01-08 | Stop reason: HOSPADM

## 2018-01-08 RX ORDER — HYDROMORPHONE HYDROCHLORIDE 2 MG/ML
0.5 INJECTION, SOLUTION INTRAMUSCULAR; INTRAVENOUS; SUBCUTANEOUS
Status: DISCONTINUED | OUTPATIENT
Start: 2018-01-08 | End: 2018-01-08 | Stop reason: HOSPADM

## 2018-01-08 RX ORDER — CLINDAMYCIN PHOSPHATE 600 MG/50ML
600 INJECTION INTRAVENOUS EVERY 8 HOURS
Status: COMPLETED | OUTPATIENT
Start: 2018-01-09 | End: 2018-01-09

## 2018-01-08 RX ORDER — PROPOFOL 10 MG/ML
INJECTION, EMULSION INTRAVENOUS AS NEEDED
Status: DISCONTINUED | OUTPATIENT
Start: 2018-01-08 | End: 2018-01-08 | Stop reason: HOSPADM

## 2018-01-08 RX ORDER — FENTANYL CITRATE 50 UG/ML
25 INJECTION, SOLUTION INTRAMUSCULAR; INTRAVENOUS
Status: DISCONTINUED | OUTPATIENT
Start: 2018-01-08 | End: 2018-01-08 | Stop reason: HOSPADM

## 2018-01-08 RX ORDER — SODIUM CHLORIDE 0.9 % (FLUSH) 0.9 %
5-10 SYRINGE (ML) INJECTION AS NEEDED
Status: DISCONTINUED | OUTPATIENT
Start: 2018-01-08 | End: 2018-01-11 | Stop reason: HOSPADM

## 2018-01-08 RX ORDER — ONDANSETRON 2 MG/ML
4 INJECTION INTRAMUSCULAR; INTRAVENOUS
Status: DISCONTINUED | OUTPATIENT
Start: 2018-01-08 | End: 2018-01-08 | Stop reason: HOSPADM

## 2018-01-08 RX ORDER — SODIUM CHLORIDE 0.9 % (FLUSH) 0.9 %
5-10 SYRINGE (ML) INJECTION EVERY 8 HOURS
Status: DISCONTINUED | OUTPATIENT
Start: 2018-01-08 | End: 2018-01-11 | Stop reason: HOSPADM

## 2018-01-08 RX ORDER — OXYCODONE AND ACETAMINOPHEN 5; 325 MG/1; MG/1
1 TABLET ORAL
Status: DISCONTINUED | OUTPATIENT
Start: 2018-01-08 | End: 2018-01-11 | Stop reason: HOSPADM

## 2018-01-08 RX ORDER — DEXAMETHASONE SODIUM PHOSPHATE 4 MG/ML
INJECTION, SOLUTION INTRA-ARTICULAR; INTRALESIONAL; INTRAMUSCULAR; INTRAVENOUS; SOFT TISSUE AS NEEDED
Status: DISCONTINUED | OUTPATIENT
Start: 2018-01-08 | End: 2018-01-08 | Stop reason: HOSPADM

## 2018-01-08 RX ORDER — SODIUM CHLORIDE, SODIUM LACTATE, POTASSIUM CHLORIDE, CALCIUM CHLORIDE 600; 310; 30; 20 MG/100ML; MG/100ML; MG/100ML; MG/100ML
50 INJECTION, SOLUTION INTRAVENOUS CONTINUOUS
Status: DISCONTINUED | OUTPATIENT
Start: 2018-01-08 | End: 2018-01-08 | Stop reason: HOSPADM

## 2018-01-08 RX ORDER — ENOXAPARIN SODIUM 100 MG/ML
40 INJECTION SUBCUTANEOUS DAILY
Status: DISCONTINUED | OUTPATIENT
Start: 2018-01-09 | End: 2018-01-11 | Stop reason: HOSPADM

## 2018-01-08 RX ORDER — OXYCODONE AND ACETAMINOPHEN 10; 325 MG/1; MG/1
1 TABLET ORAL
Status: DISCONTINUED | OUTPATIENT
Start: 2018-01-08 | End: 2018-01-11 | Stop reason: HOSPADM

## 2018-01-08 RX ORDER — FENTANYL CITRATE 50 UG/ML
INJECTION, SOLUTION INTRAMUSCULAR; INTRAVENOUS AS NEEDED
Status: DISCONTINUED | OUTPATIENT
Start: 2018-01-08 | End: 2018-01-08 | Stop reason: HOSPADM

## 2018-01-08 RX ORDER — DIPHENHYDRAMINE HYDROCHLORIDE 50 MG/ML
25 INJECTION, SOLUTION INTRAMUSCULAR; INTRAVENOUS
Status: DISCONTINUED | OUTPATIENT
Start: 2018-01-08 | End: 2018-01-08 | Stop reason: HOSPADM

## 2018-01-08 RX ORDER — EPHEDRINE SULFATE 50 MG/ML
INJECTION, SOLUTION INTRAVENOUS AS NEEDED
Status: DISCONTINUED | OUTPATIENT
Start: 2018-01-08 | End: 2018-01-08 | Stop reason: HOSPADM

## 2018-01-08 RX ORDER — CLINDAMYCIN PHOSPHATE 900 MG/50ML
900 INJECTION INTRAVENOUS
Status: COMPLETED | OUTPATIENT
Start: 2018-01-08 | End: 2018-01-08

## 2018-01-08 RX ORDER — LIDOCAINE HYDROCHLORIDE 20 MG/ML
INJECTION, SOLUTION EPIDURAL; INFILTRATION; INTRACAUDAL; PERINEURAL AS NEEDED
Status: DISCONTINUED | OUTPATIENT
Start: 2018-01-08 | End: 2018-01-08 | Stop reason: HOSPADM

## 2018-01-08 RX ADMIN — PROPOFOL 100 MG: 10 INJECTION, EMULSION INTRAVENOUS at 17:41

## 2018-01-08 RX ADMIN — MORPHINE SULFATE 4 MG: 10 INJECTION INTRAMUSCULAR; INTRAVENOUS; SUBCUTANEOUS at 07:59

## 2018-01-08 RX ADMIN — Medication 10 ML: at 22:00

## 2018-01-08 RX ADMIN — ONDANSETRON 4 MG: 2 INJECTION INTRAMUSCULAR; INTRAVENOUS at 18:52

## 2018-01-08 RX ADMIN — MIDAZOLAM HYDROCHLORIDE 2 MG: 1 INJECTION, SOLUTION INTRAMUSCULAR; INTRAVENOUS at 17:37

## 2018-01-08 RX ADMIN — SODIUM CHLORIDE 100 ML/HR: 900 INJECTION, SOLUTION INTRAVENOUS at 20:35

## 2018-01-08 RX ADMIN — DEXAMETHASONE SODIUM PHOSPHATE 4 MG: 4 INJECTION, SOLUTION INTRA-ARTICULAR; INTRALESIONAL; INTRAMUSCULAR; INTRAVENOUS; SOFT TISSUE at 17:41

## 2018-01-08 RX ADMIN — MORPHINE SULFATE 4 MG: 10 INJECTION INTRAMUSCULAR; INTRAVENOUS; SUBCUTANEOUS at 03:11

## 2018-01-08 RX ADMIN — EPHEDRINE SULFATE 10 MG: 50 INJECTION, SOLUTION INTRAVENOUS at 18:45

## 2018-01-08 RX ADMIN — SUCCINYLCHOLINE CHLORIDE 100 MG: 20 INJECTION INTRAMUSCULAR; INTRAVENOUS at 17:41

## 2018-01-08 RX ADMIN — SODIUM CHLORIDE 100 ML/HR: 900 INJECTION, SOLUTION INTRAVENOUS at 07:06

## 2018-01-08 RX ADMIN — FENTANYL CITRATE 100 MCG: 50 INJECTION, SOLUTION INTRAMUSCULAR; INTRAVENOUS at 17:41

## 2018-01-08 RX ADMIN — OXYCODONE HYDROCHLORIDE AND ACETAMINOPHEN 1 TABLET: 5; 325 TABLET ORAL at 20:41

## 2018-01-08 RX ADMIN — CLINDAMYCIN PHOSPHATE 900 MG: 18 INJECTION, SOLUTION INTRAVENOUS at 17:50

## 2018-01-08 RX ADMIN — LIDOCAINE HYDROCHLORIDE 60 MG: 20 INJECTION, SOLUTION EPIDURAL; INFILTRATION; INTRACAUDAL; PERINEURAL at 17:41

## 2018-01-08 NOTE — PROGRESS NOTES
[facility]/[address]   Discharge Planning/ Assessment    Reasons for Intervention: Chart reviewed. Met with pt., verified all demographics. [facility] has MCR/BC ins. [facility] Dr. Pineda Baig is his PCP, however he moved his office out to [facility] hasn't seen him in over a year, states would like new PCP, referral sent to . NOK: Jeanne Hendrix, niece: [phone], lives in [place]. Lives alone. Uses no DME. Independent with ADL's prior to admit. Still drives. Made him aware due to hip fracture, will likely need SNF for rehab, he is agreeable. States ok to send out to [facility] & he will choose from accepting SNF's, done. PLAN: SNF when medically stable & bed available. Will cont to follow. Tierra Roa RN,ext. [phone].      High Risk Criteria  [x] Yes  []No   Physician Referral  [] Yes  [x]No        Date    Nursing Referral  [] Yes  [x]No        Date    Patient/Family Request  [] Yes  [x]No        Date       Resources:    Medicare  [x] Yes  []No   Medicaid  [] Yes  [x]No   No Resources  [] Yes  [x]No   Private Insurance  [x] Yes  []No    Name/Phone Number    Other  [] Yes  [x]No        (i.e. Workman's Comp)         Prior Services:    Prior Services  [] Yes  [x]No   Home Health  [] Yes  [x]No   [address]  [] Yes  [x]No        Number of [address]  [] Yes  [x]No       Meals on Wheels  [] Yes  [x]No   Office on Aging  [] Yes  [x]No   Transportation Services  [] Yes  [x]No   Nursing Home  [] Yes  [x]No        Nursing Home Name    [address]  [] Yes  [x]No        [address] Name    Other       Information Source:      Information obtained from  [x] Patient  [] Parent   [] [address]  [] Child  [] Spouse   [] Significant Other/Partner   [] Friend      [] EMS    [] Nursing Home Chart          [] Other:   Chart Review  [x] Yes  []No     Family/Support System:    Patient lives with  [x] Alone    [] Spouse   [] Significant Other  [] Children [] Caretaker   [] Parent  [] Sibling     [] Other       Other Support System:    Is the patient responsible for care of others  [] Yes  [x]No   Information of person caring for patient on  discharge    Managers financial affairs independently  [x] Yes  []No   If no, explain:      Status Prior to Admission:    Mental Status  [x] Awake  [x] Alert  [x] Oriented  [x] Quiet/Calm [] Lethargic/Sedated   [] Disoriented  [] Restless/Anxious  [] Combative   Personal Care  [] Dependent  [x] [address]  [] Requires Assistance   Meal Preparation Ability  [x] Independent   [] Standby Assistance   [] Minimal Assistance   [] Moderate Assistance  [] Maximum Assistance     [] Total Assistance   Chores  [x] Independent with Chores   [] [address] Resident   [] Requires Assistance   Bowel/Bladder  [x] Continent  [] Catheter  [] Incontinent  [] Ostomy Self-Care    [] Urine Diversion Self-Care  [] Maximum Assistance     [] Total Assistance   Number of Persons needed for assistance    DME at home  [] Tremaine Khanna  [] Andie Khanna   [] Commode    [] Bathroom/Grab Bars  [] Hospital Bed  [] Nebulizer  [] Oxygen           [] Raised Toilet Seat  [] Shower Chair  [] Side Rails for Bed   [] Tub Transfer Bench   [] Mario Cazares  [] Charlotte Robins      [] Other:   Vendor      Treatment Presently Receiving:    Current Treatments  [] Chemotherapy  [] Dialysis  [] Insulin  [] IVAB [x] IVF   [] O2  [] PCA   [] PT   [] RT   [] Tube Feedings   [] Wound Care     Psychosocial Evaluation:    Verbalized Knowledge of Disease Process  [] Patient  []Family   Coping with Disease Process  [] Patient  []Family   Requires Further Counseling Coping with Disease Process  [] Patient  []Family     Identified Projected Needs:    Home Health Aid  [] Yes  [x]No   Transportation  [] Yes  [x]No   Education  [] Yes  [x]No        Specific Education     Financial Counseling  [] Yes  [x]No   Inability to Care for Self/Will Require 24 hour care  [] Yes  [x]No   Pain Management  [] Yes  [x]No   Home Infusion Therapy  [] Yes  [x]No   Oxygen Therapy  [] Yes  [x]No   DME  [] Yes  [x]No   Long Term Care Placement  [] Yes  [x]No   Rehab  [] Yes  [x]No   Physical Therapy  [x] Yes  []No   Needs Anticipated At This Time  [x] Yes  []No     Intra-Hospital Referral:    [address] [facility]  [] Yes  [x]No     [x] Yes  []No   Patient Representative  [] Yes  [x]No   Staff for Teaching Needs  [] Yes  [x]No   Specialty Teaching Needs     Diabetic Educator  [] Yes  [x]No   Referral for Diabetic Educator Needed  [] Yes  [x]No  If Yes, place order for Nutritionist or Diabetic Consult     Tentative Discharge Plan:    Home with No Services  [] Yes  [x]No   Home with Home Health Follow-up  [] Yes  [x]No        If Yes, specify type    Home Care Program  [] Yes  [x]No        If Yes, specify type    Meals on Wheels  [] Yes  [x]No   Office of Aging  [] Yes  [x]No   NHP  [] Yes  [x]No   Return to the Nursing Home  [] Yes  [x]No   Rehab Therapy  [x] Yes  []No   Acute Rehab  [] Yes  [x]No   Subacute Rehab  [x] Yes  []No   Private Care  [] Yes  [x]No   Substance Abuse Referral  [] Yes  [x]No   Transportation  [] Yes  [x]No   Chore Service  [] Yes  [x]No   Inpatient Hospice  [] Yes  [x]No   OP RT  [] Yes  [x] No   OP Hemo  [] Yes  [x] No   OP PT  [] Yes  [x]No   Support Group  [] Yes  [x]No   Reach to Recovery  [] Yes  [x]No   OP Oncology Clinic  [] Yes  [x]No   Clinic Appointment  [] Yes  [x]No   DME  [] Yes  [x]No   Comments    Name of D/C Planner or  Given to Patient or Family Kayleigh Marte   Phone Number Pager: [phone]        Extension Ext [phone]. VM [phone]   Date 1-8-2018   Time    If you are discharged home, whom do you designate to participate in your discharge plan and receive any information needed?      Enter name of [address]        Phone # of designee         Address of designee         Updated         Patient refused to designate any individual

## 2018-01-08 NOTE — BRIEF OP NOTE
BRIEF OPERATIVE NOTE    Date of Procedure: 1/8/2018   Preoperative Diagnosis: right hip femoral neck fracture  Postoperative Diagnosis: same   Procedure(s):   RIGHT HIP BIPOLAR HEMIARTHROPLASTY  Surgeon(s) and Role:     * Nancy Nicolas MD - Primary         Assistant Staff:       Surgical Staff:  Circ-1: Hector Grove RN  Scrub Tech-1: Francisco Reed  Surg Asst-1: Sage Bojorquez case tracking events are documented in the log. Anesthesia: General   Estimated Blood Loss: 200cc  Specimens: femoral head   Findings: hip fx, dictated [id number]  Complications: none  Implants: Exactech Bipolar hemiarthroplasty, size 15, 55mm +0 bipolar head.

## 2018-01-08 NOTE — ROUTINE PROCESS
Pt received to floor via strecher from ED, pt alert and oriented x 4, pt states he fell and slipped on the ice crossing the street. Pt denies hitting his head. Pt admitted with fx to right hip. Small abrasion noted to right hand and right elbow. right foot warm pedal pulse present. Pt made comfortable in bed, call bell in pt reach, urinal in pt reach. Pt taking small sips of water, pt offered food but declined. Pt aware of NPO order for after midnight  2130 IV fluids up as ordered  2140 pt medicated for hip pain level 10 with morphine 4mg IV as ordered. Blood sugar by accucheck 94  2235 attempted to insert mills, some resistance and scant amount of bleeding noted. 2250 sister katy inserted mills catheter, draining good amounts of clear yellow urine, urine specimen obtained and sent to lab  2300 pt resting quietly in bed, no distress noted  0000 NPO as ordered  0100 Pt sleeping no distress noted  0310 Pt awake c/o pain level 7 in right hip, medicated with IV morphine as ordered. Mills draining well good amounts of clear yellow urine.  HOB elevated call bell in pt reach

## 2018-01-08 NOTE — PERIOP NOTES
TRANSFER - IN REPORT:    Verbal report received from [place], [address] on [address]  being received from room 2210 for routine progression of care      Report consisted of patients Situation, Background, Assessment and   Recommendations(SBAR). Information from the following report(s) SBAR was reviewed with the receiving nurse. Opportunity for questions and clarification was provided. Assessment completed in patients room. Patent LAC PIV. NS gtt infusing. Surgery consent signed. Need MD signature. Need Anes consent. Pt needs nasal swabs and clindamycin gtt once in [facility]. Pts friend aware of surgery and will be in to see pt later. No family in the area.

## 2018-01-08 NOTE — CONSULTS
Orthopaedic Consult    CC:  Right hip fracture              HPI:  Kathie Allen is a 65 y.o. male who is going to undergo right hip hemiarthroplasty for femoral neck fracture. Patient describes mechanical fall prior to ED admission. Denies any other injury. Previous treatment and diagnostic tests include ED imaging and medical clearance. History:  History reviewed. No pertinent past medical history. History reviewed. No pertinent surgical history. Allergies   Allergen Reactions    Pcn [Penicillins] Rash    Sulfa (Sulfonamide Antibiotics) Rash         Review of Systems:    General: in no apparent distress, well developed and well nourished, non-toxic, in no respiratory distress and acyanotic, alert and oriented times 3  Musculoskeletal:  Right hip pain    Physical Assessment:  Blood pressure 139/73, pulse 96, temperature 99.1 °F (37.3 °C), resp. rate 18, height 5' 10\" (1.778 m), weight 63.5 kg (140 lb), SpO2 93 %. CBC w/Diff    Lab Results   Component Value Date/Time    WBC 12.0 01/08/2018 03:10 AM    RBC 4.46 (L) 01/08/2018 03:10 AM    HGB 13.7 01/08/2018 03:10 AM    HCT 40.5 01/08/2018 03:10 AM    MCV 90.8 01/08/2018 03:10 AM    MCH 30.7 01/08/2018 03:10 AM    MCHC 33.8 01/08/2018 03:10 AM    RDW 14.7 (H) 01/08/2018 03:10 AM    Lab Results   Component Value Date/Time    MONOS 8 01/08/2018 03:10 AM    EOS 0 01/08/2018 03:10 AM    BASOS 0 01/08/2018 03:10 AM    RDW 14.7 (H) 01/08/2018 03:10 AM          Coagulation   Lab Results   Component Value Date    INR 0.9 01/07/2018            Exam:  General:   CAOx4, Non toxic appearing   Extremities:  NO TTP noted overlying c-spine, RUE, LUE, LLE or pain with ROM. RLE - TTP noted overlying right hip with mild shortening of RLE. Compartments soft. RLE NT distal to fracture site. Plantar and dorsiflexion intact. LT sensation intact. Palpable DP/PT pulses noted. Denies paresthesias. Skin:   Intact proximal to fracture site.            Radiology:   Right HIP  EXAM: Hip Two Views     Indication: Fall on ice, right hip pain.     Technique: Frontal and crosstable lateral views of the right hip.     Comparison studies: None.  _____________  FINDINGS:  Right femoral neck fracture with superior lateral angulation and displacement,  minimally comminuted configuration with small osseous bodies of the superior  inferior aspect of the fracture. Intact femoral head. Intact visualized right  hemipelvis.     _____________  IMPRESSION  IMPRESSION:     1. Right femoral neck fracture deformity with, superior lateral angulation. Assessment:   1. Right femoral neck fracture, [place] 3    Plan:  1. NPO  2. OR today for left hip hemiarthroplasty  3. Bedrest, pain control   4. Discussed importance of smoking cessation with impending orthopaedic surgery; patient verbalizes understanding. Operative procedure discussion: I personally discussed treatment options with the patient and any available family. We reviewed the details of the procedure, and risks of surgery to include complications of anesthesia, blood clots, infection which could involve further surgery including amputation, nerve and vessel injury. Patient and/or family agree to proceed with the recommended procedure and treatment plan. They verbally confirm that their questions have been answered. Written consent for procedure obtained and placed with the patient's chart. Thank you for allowing us to assist in this patient's care.   Royal Lawrence PA-C  1/8/2018  Office: [phone]

## 2018-01-08 NOTE — INTERDISCIPLINARY ROUNDS
IDR Summary      Patient: Pedro Hilton MRN: [id number]    Age: 65 y.o.  : 1940     Admit Diagnosis: Hip fracture, right (HCC)  Hip fracture, right ([address]  right hip fracture      POD #: N/A    DIET status: NPO    Lines/Tubes:   IV: YES   Needed: YES  Justin: YES  Needed:YES  Central Line: NO Needed: NO      VTE Prophylaxis: Mechanical    Mobility needs: Yes     PT ordered:  NO PT eval on chart: NO    OT ordered:  NO OT eval on chart: NO      ST ordered:  NO ST eval on chart:  NO     Disposition/Care Management:  Discharge plan: TBD after OR and PT/OT evals completed      Home Health ordered? NO     Recommended DME from PT/OT:      DME ordered? NO     SNF- has patient been matched? NO    Accepting bed? NO   Does patient require insurance auth? NO      Barriers to discharge:   Financial concerns:No   PCP: Karlos Sanchez MD    : NO  Interventions:       LOS: 1 days     Expected days until discharge:  TBD- patient to potentially be added to OR schedule for . PT/OT will be ordered post op and will determine dispo.            Signed:     EMI Breen-BC  [address]  Hospitalist Division  Pager:  [phone]  Office:  [phone]

## 2018-01-08 NOTE — H&P
History and Physical    Patient: Gauri Jerome               Sex: male          DOA: 1/7/2018       YOB: 1940      Age:  65 y.o.        LOS:  LOS: 0 days        Chief Complaint   Patient presents with    Hip Pain    Fall         HPI:     Gauri Jerome is a 65 y.o. male who presents with right hip pain following a fall around 5 pm in the snow. Patient reports that he slipped on an icy surface and fell. He landed on his right hip and had instant severe pain 10/10. He denies LOC or hitting head. In the ED XR hip shows femoral neck fracture. Patient is hemodynamically stable. Orthopedic surgery was consulted by ED and plan for OR visit in the morning. History reviewed. No pertinent past medical history. [facility] History reviewed. No pertinent surgical history. No current facility-administered medications on file prior to encounter. No current outpatient prescriptions on file prior to encounter. Social History     Social History    Marital status: SINGLE     Spouse name: N/A    Number of children: N/A    Years of education: N/A     Occupational History    Not on file. Social History Main Topics    Smoking status: Current Every Day Smoker     Years: 0.50     Types: Cigarettes    Smokeless tobacco: Never Used    Alcohol use Yes    Drug use: No    Sexual activity: Not on file     Other Topics Concern    Not on file     Social History Narrative       None       History reviewed. No pertinent family history. Allergies   Allergen Reactions    Pcn [Penicillins] Rash    Sulfa (Sulfonamide Antibiotics) Rash       Review of Systems   Constitutional: Negative. HENT: Negative. Eyes: Negative. Respiratory: Negative. Gastrointestinal: Negative. Genitourinary: Negative. Musculoskeletal: Positive for falls and joint pain. Skin: Negative. Neurological: Negative. Psychiatric/Behavioral: Negative.         Physical Exam:       Visit Vitals    /74 (BP 1 Location: Right arm, BP Patient Position: At rest)    Pulse 75    Temp 97.9 °F (36.6 °C)    Resp 18    Ht 5' 10\" (1.778 m)    Wt 63.5 kg (140 lb)    SpO2 95%    BMI 20.09 kg/m2     Physical Exam   Constitutional: He is oriented to person, place, and time. He appears well-developed and well-nourished. No distress. Eyes: Conjunctivae are normal. No scleral icterus. Neck: Neck supple. Cardiovascular: Normal rate, regular rhythm and normal heart sounds. No murmur heard. Pulmonary/Chest: Effort normal and breath sounds normal. No respiratory distress. He has no wheezes. He has no rales. Musculoskeletal: He exhibits no edema. Right hip: He exhibits decreased range of motion and tenderness. R doralis pedis +2   Neurological: He is alert and oriented to person, place, and time. Skin: Skin is warm. No rash noted. He is not diaphoretic. No erythema. No pallor. Psychiatric: He has a normal mood and affect. Ancillary Studies: All lab and imaging reviewed for the past 24 hours.     Recent Results (from the past 24 hour(s))   EKG, 12 LEAD, INITIAL    Collection Time: 01/07/18  7:24 PM   Result Value Ref Range    Ventricular Rate 71 BPM    Atrial Rate 71 BPM    P-R Interval 146 ms    QRS Duration 84 ms    Q-T Interval 408 ms    QTC Calculation (Bezet) 443 ms    Calculated P Axis 77 degrees    Calculated R Axis 77 degrees    Calculated T Axis 78 degrees    Diagnosis       Normal sinus rhythm  Normal ECG  No previous ECGs available     CBC WITH AUTOMATED DIFF    Collection Time: 01/07/18  7:25 PM   Result Value Ref Range    WBC 9.4 4.6 - 13.2 K/uL    RBC 4.75 4.70 - 5.50 M/uL    HGB 14.6 13.0 - 16.0 g/dL    HCT 43.1 36.0 - 48.0 %    MCV 90.7 74.0 - 97.0 FL    MCH 30.7 24.0 - 34.0 PG    MCHC 33.9 31.0 - 37.0 g/dL    RDW 14.6 (H) 11.6 - 14.5 %    PLATELET 244 735 - 256 K/uL    MPV 10.8 9.2 - 11.8 FL    NEUTROPHILS 80 (H) 40 - 73 %    LYMPHOCYTES 13 (L) 21 - 52 %    MONOCYTES 6 3 - 10 % EOSINOPHILS 1 0 - 5 %    BASOPHILS 0 0 - 2 %    ABS. NEUTROPHILS 7.5 1.8 - 8.0 K/UL    ABS. LYMPHOCYTES 1.2 0.9 - 3.6 K/UL    ABS. MONOCYTES 0.5 0.05 - 1.2 K/UL    ABS. EOSINOPHILS 0.1 0.0 - 0.4 K/UL    ABS. BASOPHILS 0.0 0.0 - 0.06 K/UL    DF AUTOMATED     METABOLIC PANEL, BASIC    Collection Time: 01/07/18  7:25 PM   Result Value Ref Range    Sodium 140 136 - 145 mmol/L    Potassium 4.8 3.5 - 5.5 mmol/L    Chloride 108 100 - 108 mmol/L    CO2 25 21 - 32 mmol/L    Anion gap 7 3.0 - 18 mmol/L    Glucose 100 (H) 74 - 99 mg/dL    BUN 22 (H) 7.0 - 18 MG/DL    Creatinine 0.84 0.6 - 1.3 MG/DL    BUN/Creatinine ratio 26 (H) 12 - 20      GFR est AA >60 >60 ml/min/1.73m2    GFR est non-AA >60 >60 ml/min/1.73m2    Calcium 9.0 8.5 - 10.1 MG/DL   TYPE & SCREEN    Collection Time: 01/07/18  7:25 PM   Result Value Ref Range    Crossmatch Expiration 01/10/2018     ABO/Rh(D) Stuart Oar POSITIVE     Antibody screen NEG    PROTHROMBIN TIME + INR    Collection Time: 01/07/18  7:25 PM   Result Value Ref Range    Prothrombin time 12.0 11.5 - 15.2 sec    INR 0.9 0.8 - 1.2         Assessment/Plan     Principal Problem:    Hip fracture, right (HCC) (1/7/2018)    Active Problems:    Current smoker (1/7/2018)        PLAN:    Right Hip fracture   - XR hip reviewed.  Femoral neck fractured   - Pain control prn  - NPO   - SCD  - Supportive care  - PT when appropriate   - Orthopedic surgery consulted by ED     Current smoker   - Advise cessation     DVT prophylaxis - SCD    GI prophylaxis - Protonix     Full code     Zulma Hedrick MD  1/7/2018  7:32 PM

## 2018-01-08 NOTE — ACP (ADVANCE CARE PLANNING)
Patient has designated __his friend______________________ to participate in his/her discharge plan and to receive any needed information.      Name:  [facility]  Address:  Phone number:  [phone](  Pt can't remember, states has it in his phone & his friend will be in this afternoon)

## 2018-01-08 NOTE — ROUTINE PROCESS
Bedside and Verbal shift change report given to Julianna Lyman (oncoming nurse) by Bay Deleon RN   (offgoing nurse). Report included the following information SBAR, Intake/Output, MAR and Recent Results.

## 2018-01-08 NOTE — ED PROVIDER NOTES
[facility] EMERGENCY DEPT      8:00 PM    Date: 1/7/2018  Patient Name: Shanda Knox    History of Presenting Illness     Chief Complaint   Patient presents with    Hip Pain    Fall     65 y.o. male with a PMH of Bladder Diverticula presents to the ED via EMS c/o right hip pain after a fall at 5pm.  Pt states he was walking across the street when he slipped and fell on ice. Describes it as a sharp pain that is worse with movement. He was given Fentanyl 50 intranasal and 50 IV en route with relief of pain. He did not sustain any other injury when he fell, including head or neck injury. Denies fever, chills, numbness, weakness, other symptoms at this time. No other complaints. Nursing nurses regarding the HPI and triage nursing notes were reviewed. Prior medical records were reviewed. Past History     Past Medical History:  History reviewed. No pertinent past medical history. Past Surgical History:  History reviewed. No pertinent surgical history. Family History:  History reviewed. No pertinent family history. Social History:  Social History   Substance Use Topics    Smoking status: Current Every Day Smoker     Years: 0.50     Types: Cigarettes    Smokeless tobacco: Never Used    Alcohol use Yes       Allergies: Allergies   Allergen Reactions    Pcn [Penicillins] Rash    Sulfa (Sulfonamide Antibiotics) Rash       Patient's primary care provider (as noted in EPIC):  Ant Watson MD    Constitutional:  Denies malaise, fever, chills. Head:  Denies injury. Face:  Denies injury or pain. Neck:  Denies injury or pain. Chest:  Denies injury. GI/ABD:  Denies injury, pain, distention, nausea, vomiting, diarrhea. Back:  Denies injury or pain. Pelvis:  Denies injury or pain. Extremity/MS:  + right hip pain. Denies any other extremity pain. Neuro:  Denies headache, LOC, dizziness, neurologic symptoms/deficits/paresthesias.    Skin: Denies injury, rash, itching or skin changes. All other systems negative as reviewed. Visit Vitals    /76    Pulse 82    Temp 98.2 °F (36.8 °C)    Resp 16    Ht 5' 10\" (1.778 m)    Wt 63.5 kg (140 lb)    SpO2 97%    BMI 20.09 kg/m2       PHYSICAL EXAM:    CONSTITUTIONAL: Alert, in no apparent distress; resting comfortably on stretcher; well-developed; well-nourished. HEAD:  Normocephalic, atraumatic. No Battles sign. No Raccoons eyes. EYES:  EOM's intact. Normal conjunctiva. Anicteric sclera. ENTM: Nose: no rhinorrhea; Oropharynx:  mucous membranes moist  Neck:  No cervical vertebral bony point tenderness or step-off. RESP: Chest clear, equal breath sounds. Without wheezes, rhonchi or rales. CARDIOVASCULAR:  Regular rate and rhythm. No murmurs, rubs, or gallops. GI: Normal bowel sounds, abdomen soft and non-tender. No masses or organomegaly. : No costo-vertebral angle tenderness. BACK:  No TLS vertebral bony point tenderness or step-off. UPPER EXT:  Normal inspection, full ROM throughout. LOWER EXT: Right hip with reproducible TTP, leg is externally rotated; NVI distally with full sensation, <2 sec cap refill, and pedal pulses obtained with doppler; remainder of lower extremities without any abnormalities. NEURO: Grossly normal motor and sensation. SKIN: No rashes; Normal for age and stage. PSYCH:  Alert and oriented, normal affect. DIFFERENTIAL DIAGNOSES/ MEDICAL DECISION MAKING:  Contusion, sprain, dislocation, fracture, ligamentous tear/ disruption or a combination of the above. Xray right hip: mildly impacted femoral neck fracture. ED COURSE:      Patient is comfortable as long as he is not moving his leg. Consult 7:15 PM:   I have discussed case with Oscar Nguyen. Standard discussion regarding this patient's presenting symptoms, PMHX, exam, vital signs, available ancillary and diagnostic studies.   Consulting PA states they will perform surgery tomorrow and would like the patient to be NPO past midnight. Consult 7:30 PM:   I have discussed case with Dr. Cameron Ontiveros. Standard discussion regarding this patient's presenting symptoms, PMHX, exam, vital signs, available ancillary and diagnostic studies. Consulting MD states: he will accept the patient. Diagnosis:   1. Closed fracture of neck of right femur, initial encounter ([facility] [address]    2.  Fall due to slipping on ice or snow, initial encounter      Disposition: Admission     JUAN Kay

## 2018-01-08 NOTE — DISCHARGE INSTRUCTIONS
Surgery to Repair a Hip Fracture: What to Expect at Home  Your Recovery    Surgery for a hip fracture repairs a broken hip bone. When you leave the hospital after surgery, you will probably be walking with crutches or a walker. You may be able to climb a few stairs and get in and out of bed and chairs. But you will need someone to help you at home for the next few weeks or until you have more energy and can move around better. If there is no one to help you at home, you may go to a rehabilitation center or long-term care center. You will go home with a bandage and stitches or staples. You can remove the bandage when your doctor tells you to. Your doctor will remove your stitches or staples 10 days to 3 weeks after your surgery. You may still have some mild pain, and the area may be swollen for 3 to 4 months after surgery. Your doctor will give you medicine for the pain. You will continue the rehabilitation program (rehab) you started in the hospital. The better you do with your rehab exercises, the quicker you will get your strength and movement back. Most people are able to return to work 4 weeks to 4 months after surgery. But it may take 6 months to 1 year for you to fully recover. Some people, especially older people, are never able to move quite as well as they used to. You heal best when you take good care of yourself. Eat a variety of healthy foods, and don't smoke. This care sheet gives you a general idea about how long it will take for you to recover. But each person recovers at a different pace. Follow the steps below to get better as quickly as possible. How can you care for yourself at home? Activity  ? · Rest when you feel tired. You may take a nap, but do not stay in bed all day. ? · Work with your physical therapist to learn the best way to exercise. You may be able to take frequent, short walks using crutches or a walker.  You will probably have to use crutches or a walker for at least 4 to 6 weeks. After that, you may need to use a cane to help you walk. ? · Do not sit for longer than 30 to 45 minutes at a time. When you sit, use chairs with arms, and do not sit in low chairs. ? · Sleep on your back with your legs slightly apart or on your side with a pillow between your knees for about 6 weeks or as your doctor tells you. Do not sleep on your stomach or affected hip. ? · You may need to take sponge baths until your stitches or staples have been removed. You will probably be able to shower 24 hours after they are removed. Ask your doctor when it is okay to bathe or shower. ? · Ask your doctor when you can drive again. ? · Most people are able to return to work 4 weeks to 4 months after surgery. ? · Ask your doctor when it is okay for you to have sex. ? · Do not lift anything that would make you strain. This may include heavy grocery bags and milk containers, a heavy briefcase or backpack, cat litter or dog food bags, a vacuum , or a child. Diet  ? · By the time you leave the hospital, you will probably be eating your normal diet. If your stomach is upset, try bland, low-fat foods like plain rice, broiled chicken, toast, and yogurt. Your doctor may recommend that you take iron and vitamin supplements. ? · Continue to drink plenty of fluids. ? · Eat healthy foods, and watch your portion sizes. Try to stay at your ideal weight. Too much weight puts more stress on your hip joint. ? · You may notice that your bowel movements are not regular right after your surgery. This is common. Try to avoid constipation and straining with bowel movements. You may want to take a fiber supplement every day. If you have not had a bowel movement after a couple of days, ask your doctor about taking a mild laxative. ? · Your doctor may want you to take calcium supplements and eat foods high in calcium, such as milk, cheese, ice cream, and salmon with bones. These help stop bone loss.  Orange juice and soy milk with added calcium are also good choices. Medicines  ? · Your doctor will tell you if and when you can restart your medicines. He or she will also give you instructions about taking any new medicines. ? · If you take blood thinners, such as warfarin (Coumadin), clopidogrel (Plavix), or aspirin, be sure to talk to your doctor. He or she will tell you if and when to start taking those medicines again. Make sure that you understand exactly what your doctor wants you to do.   ? · Your doctor may give you a blood-thinning medicine to prevent blood clots. If you take a blood thinner, be sure you get instructions about how to take your medicine safely. Blood thinners can cause serious bleeding problems. This medicine could be in pill form or as a shot (injection). If a shot is necessary, your doctor will tell you how to do this. ? · Be safe with medicines. Take pain medicines exactly as directed. ¨ If the doctor gave you a prescription medicine for pain, take it as prescribed. ¨ If you are not taking a prescription pain medicine, ask your doctor if you can take an over-the-counter medicine. ? · If you think your pain medicine is making you sick to your stomach:  ¨ Take your medicine after meals (unless your doctor has told you not to). ¨ Ask your doctor for a different pain medicine. ? · If your doctor prescribed antibiotics, take them as directed. Do not stop taking them just because you feel better. You need to take the full course of antibiotics. ? · Your doctor may also prescribe medicines or calcium supplements to make your bones stronger. Incision care  ? · You will have a bandage over the cut (incision) and staples or stitches. If there is no drainage, most doctors will let you take the bandage off in a few days. ? · Your doctor will remove the staples or stitches 10 days to 3 weeks after the surgery and replace them with strips of tape.  Leave the tape on for a week or until it falls off. Exercise  ? · Your rehab program will include a number of exercises to do. Always do them as your therapist tells you. ? · Do not do any vigorous exercise for 12 weeks or until your doctor tells you it is okay. Ice and elevation  ? · For pain, put ice or a cold pack on the area for 10 to 20 minutes at a time. Put a thin cloth between the ice and your skin. ? · Your ankle may swell for about 3 months. Prop up your ankle when you ice it or anytime you sit or lie down. Try to keep it above the level of your heart. This will help reduce swelling. Other instructions  ? · Continue to wear your support stockings as your doctor says. These help to prevent blood clots. The length of time that you will have to wear them depends on your activity level and the amount of swelling you have. Most people wear these stockings for 4 to 6 weeks after surgery. ?Preventing falls is also very important. To prevent falls:  ? · Arrange furniture so that you will not trip on it. ? · Get rid of throw rugs, and move electrical cords out of the way. ? · Walk only in areas with plenty of light. ? · Put grab bars in showers and bathtubs. ? · Avoid icy or snowy sidewalks. ? · Wear shoes with sturdy, flat soles. Follow-up care is a key part of your treatment and safety. Be sure to make and go to all appointments, and call your doctor if you are having problems. It's also a good idea to know your test results and keep a list of the medicines you take. When should you call for help? Call 911 anytime you think you may need emergency care. For example, call if:  ? · You passed out (lost consciousness). ? · You have severe trouble breathing. ? · You have sudden chest pain and shortness of breath, or you cough up blood. ?Call your doctor now or seek immediate medical care if:  ? · Your leg or foot is cool or pale or changes color. ? · You cannot feel or move your leg.    ? · You have signs of a blood clot, such as:  ¨ Pain in your calf, back of the knee, thigh, or groin. ¨ Redness and swelling in your leg or groin. ? · Your incision comes open and begins to bleed, or the bleeding increases. ? · You feel like your heart is racing or beating irregularly. ? · You have signs of infection, such as:  ¨ Increased pain, swelling, warmth, or redness. ¨ Red streaks leading from the incision. ¨ Pus draining from the incision. ¨ A fever. ? Watch closely for any changes in your health, and be sure to contact your doctor if:  ? · You do not have a bowel movement after taking a laxative. ? · You do not get better as expected. Where can you learn more? Go to http://chacha-ludmila.info/. Enter P907 in the search box to learn more about \"Surgery to Repair a Hip Fracture: What to Expect at Home. \"  Current as of: March 21, 2017  Content Version: 11.4  © 0195-8139 [employer]. Care instructions adapted under license by [employer] (which disclaims liability or warranty for this information). If you have questions about a medical condition or this instruction, always ask your healthcare professional. [place] [address] any warranty or liability for your use of this information.

## 2018-01-08 NOTE — PROGRESS NOTES
Nutrition initial assessment/Plan of care      RECOMMENDATIONS:   1. Advance diet when medically indicated  2. Monitor weight and PO intake  3. RD to follow     GOALS:   1. PO intake meets >75% of protein/calorie needs by 1/13  2. Weight Maintenance by  1/15     ASSESSMENT:   Wt status classified as normal per MI of 20.1. However, Pt at nutrition risk d/t BMI <23 and age >65 years. Inadequate PO intake d/t NPO. Labs noted. Nutrition recommendations listed. RD to follow. Nutrition Diagnoses:   Inadequate PO intake related to scheduled procedure as evidenced by NPO diet ordered. Nutrition Risk:  [] High  [x] Moderate []  Low    SUBJECTIVE/OBJECTIVE:    Pt admitted for R hip fracture. Pt seen prior to right hip hemiarthroplasty for femoral neck fracture scheduled for later today. Denies having any food allergies, problems chewing/swallowing or recent wt fluctuation;-140 lbs. Reports having a good appetite PTA; currently NPO for procedure. Consumes 2 meals/day with snacks and no supplements at home. Encouraged adequate intake post surgery to promote healing. Will monitor diet advancement. Information Obtained from:    [x] Chart Review   [x] Patient   [] Family/Caregiver   [] Nurse/Physician   [] Interdisciplinary Meeting/Rounds      Diet: NPO  Medications: [x] Reviewed    Allergies: [x] Reviewed   Encounter Diagnoses     ICD-10-CM ICD-9-CM   1. [facility] right [facility] S72.001A 820.8   2. Fall due to slipping on ice or snow, initial encounter W00. 9XXA E885.9     History reviewed. No pertinent past medical history.    Labs:    Lab Results   Component Value Date/Time    Sodium 141 01/08/2018 03:10 AM    Potassium 4.2 01/08/2018 03:10 AM    Chloride 107 01/08/2018 03:10 AM    CO2 23 01/08/2018 03:10 AM    Anion gap 11 01/08/2018 03:10 AM    Glucose 94 01/08/2018 03:10 AM    BUN 18 01/08/2018 03:10 AM    Creatinine 0.73 01/08/2018 03:10 AM    Calcium 8.6 01/08/2018 03:10 AM     Anthropometrics: BMI (calculated): 20.1  Last 3 Recorded Weights in this Encounter    01/07/18 1810   Weight: 63.5 kg (140 lb)      Ht Readings from Last 1 Encounters:   01/07/18 5' 10\" (1.778 m)     No data found. IBW: 166 lb %IBW: 84% UBW: 135-140 lb  [] Weight Loss [] Weight Gain [x] Weight Stable    Estimated Nutrition Needs: [x] MSJ  [] Other:  Calories: 1769-9069 kcal Based on:   [x] Actual BW    Protein:   80-95 g Based on:   [x] Actual BW    Fluid:       2644-6073 ml Based on:   [x] Actual BW      [x] No Cultural, Faith or ethnic dietary need identified.     [] Cultural, Faith and ethnic food preferences identified and addressed     Wt Status:  [x] Normal (18.6 - 24.9) [] Underweight (<18.5) [] Overweight (25 - 29.9) [] Mild Obesity (30 - 34.9)  [] Moderate Obesity (35 - 39.9) [] Morbid Obesity (40+)       Nutrition Problems Identified:   [x] Suboptimal PO intake v/s NPO  [] Food Allergies  [] Difficulty chewing/swallowing/poor dentition  [] Constipation/Diarrhea   [] Nausea/Vomiting   [] None  [] Other:     Plan:   [] Therapeutic Diet  []  Obtained/adjusted food preferences/tolerances and/or snacks options   []  Supplements added   [] Occupational therapy following for feeding techniques  []  HS snack added   []  Modify diet texture   []  Modify diet for food allergies   []  Educate patient   []  Assist with menu selection   []  Monitor PO intake on meal rounds   [x]  Continue inpatient monitoring and intervention   []  Participated in discharge planning/Interdisciplinary rounds/Team meetings   []  Other:     Education Needs:   [] Not appropriate for teaching at this time due to: NPO   [] Identified and addressed    Nutrition Monitoring and Evaluation:  [x] Continue ongoing monitoring and intervention  [] Other    Neena Wise

## 2018-01-08 NOTE — ANESTHESIA PREPROCEDURE EVALUATION
Anesthetic History   No history of anesthetic complications            Review of Systems / Medical History  Patient summary reviewed and pertinent labs reviewed    Pulmonary  Within defined limits                 Neuro/Psych   Within defined limits           Cardiovascular  Within defined limits                     GI/Hepatic/Renal  Within defined limits              Endo/Other  Within defined limits           Other Findings   Comments:   Risk Factors for Postoperative nausea/vomiting:       History of postoperative nausea/vomiting? NO       Female? NO       Motion sickness? NO       Intended opioid administration for postoperative analgesia? YES      Smoking Abstinence  Current Smoker? NO  Elective Surgery? NO  Seen preoperatively by anesthesiologist or proxy prior to day of surgery? YES  Pt abstained from smoking 24 hours prior to anesthesia?  N/A           Physical Exam    Airway  Mallampati: II  TM Distance: 4 - 6 cm  Neck ROM: normal range of motion   Mouth opening: Normal     Cardiovascular  Regular rate and rhythm,  S1 and S2 normal,  no murmur, click, rub, or gallop             Dental  No notable dental hx       Pulmonary  Breath sounds clear to auscultation               Abdominal  Abdominal exam normal       Other Findings            Anesthetic Plan    ASA: 2, emergent  Anesthesia type: general          Induction: Intravenous  Anesthetic plan and risks discussed with: Patient

## 2018-01-08 NOTE — PROGRESS NOTES
0160 morphine given, patient in bed, call bell within reach, no acute distress noted     7821 informed by pharmacist clindamycin will be available at the preop holding, preop nurse made aware    [phone] patient left unit to OR     1917  Bedside and Verbal shift change report given to [address] (oncoming nurse) by Alejandra Echevarria RN   (offgoing nurse). Report included the following information SBAR, Kardex and MAR. Patient in OR. Oncoming made aware patient's cell phone and charge placed in zip lock bag inside patient's bedside table, brought in by patient's friend Hao. Oncoming nurse instructed to make patient aware.

## 2018-01-08 NOTE — PROGRESS NOTES
conducted an Initial consultation and Spiritual Assessment for Mamie Butterfield, who is a 65 y. o.,male. Patients Primary Language is: [place]. According to the patients EMR Adventism Affiliation is: Advent. 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 Patient was resting but easily woken up. He is hard of hearing. Explored issues of reanna and belief. Patient confirmed his Bahai affiliation as Advent, and in the 50M accompanied the music. Listened empathically to patient. He indicated that he had no spiritual concerns at this time. Provided chaplaincy education. Provided information about Spiritual Care Services. Offered assurance of continued prayer on patient's behalf. Chart reviewed. The following outcomes were achieved:  Patient shared limited information about his medical narrative. Patient expressed gratitude for the 's visit. Assessment:  Patient does not have any Bahai or cultural needs that will affect patients preferences in health care. Patient did not indicate any other spiritual or Bahai issues which require Spiritual Care Services interventions at this time. Plan:  Chaplains will continue to follow and will provide pastoral care as needed or requested. The Rev.  [address] [facility], [address] [place] [address] 13[address] [facility] [address]  [facility] [phone] / [address] [phone]

## 2018-01-09 LAB
ALBUMIN SERPL-MCNC: 2.8 G/DL (ref 3.4–5)
ALBUMIN/GLOB SERPL: 0.9 {RATIO} (ref 0.8–1.7)
ALP SERPL-CCNC: 80 U/L (ref 45–117)
ALT SERPL-CCNC: 20 U/L (ref 16–61)
ANION GAP SERPL CALC-SCNC: 9 MMOL/L (ref 3–18)
AST SERPL-CCNC: 17 U/L (ref 15–37)
BASOPHILS # BLD: 0 K/UL (ref 0–0.06)
BASOPHILS NFR BLD: 0 % (ref 0–2)
BILIRUB SERPL-MCNC: 0.9 MG/DL (ref 0.2–1)
BUN SERPL-MCNC: 17 MG/DL (ref 7–18)
BUN/CREAT SERPL: 22 (ref 12–20)
CALCIUM SERPL-MCNC: 8 MG/DL (ref 8.5–10.1)
CHLORIDE SERPL-SCNC: 106 MMOL/L (ref 100–108)
CO2 SERPL-SCNC: 25 MMOL/L (ref 21–32)
CREAT SERPL-MCNC: 0.78 MG/DL (ref 0.6–1.3)
DIFFERENTIAL METHOD BLD: ABNORMAL
EOSINOPHIL # BLD: 0 K/UL (ref 0–0.4)
EOSINOPHIL NFR BLD: 0 % (ref 0–5)
ERYTHROCYTE [DISTWIDTH] IN BLOOD BY AUTOMATED COUNT: 15.1 % (ref 11.6–14.5)
GLOBULIN SER CALC-MCNC: 3 G/DL (ref 2–4)
GLUCOSE SERPL-MCNC: 199 MG/DL (ref 74–99)
HCT VFR BLD AUTO: 35.8 % (ref 36–48)
HGB BLD-MCNC: 11.9 G/DL (ref 13–16)
LYMPHOCYTES # BLD: 0.6 K/UL (ref 0.9–3.6)
LYMPHOCYTES NFR BLD: 5 % (ref 21–52)
MCH RBC QN AUTO: 30.5 PG (ref 24–34)
MCHC RBC AUTO-ENTMCNC: 33.2 G/DL (ref 31–37)
MCV RBC AUTO: 91.8 FL (ref 74–97)
MONOCYTES # BLD: 0.8 K/UL (ref 0.05–1.2)
MONOCYTES NFR BLD: 7 % (ref 3–10)
NEUTS SEG # BLD: 10.3 K/UL (ref 1.8–8)
NEUTS SEG NFR BLD: 88 % (ref 40–73)
PLATELET # BLD AUTO: 213 K/UL (ref 135–420)
PMV BLD AUTO: 11.2 FL (ref 9.2–11.8)
POTASSIUM SERPL-SCNC: 4.3 MMOL/L (ref 3.5–5.5)
PROT SERPL-MCNC: 5.8 G/DL (ref 6.4–8.2)
RBC # BLD AUTO: 3.9 M/UL (ref 4.7–5.5)
SODIUM SERPL-SCNC: 140 MMOL/L (ref 136–145)
WBC # BLD AUTO: 11.7 K/UL (ref 4.6–13.2)

## 2018-01-09 PROCEDURE — 97530 THERAPEUTIC ACTIVITIES: CPT

## 2018-01-09 PROCEDURE — 74011250637 HC RX REV CODE- 250/637: Performed by: ORTHOPAEDIC SURGERY

## 2018-01-09 PROCEDURE — 74011250637 HC RX REV CODE- 250/637: Performed by: HOSPITALIST

## 2018-01-09 PROCEDURE — 74011250636 HC RX REV CODE- 250/636: Performed by: ORTHOPAEDIC SURGERY

## 2018-01-09 PROCEDURE — 80053 COMPREHEN METABOLIC PANEL: CPT | Performed by: HOSPITALIST

## 2018-01-09 PROCEDURE — 77030029684 HC NEB SM VOL KT MONA -A

## 2018-01-09 PROCEDURE — 85025 COMPLETE CBC W/AUTO DIFF WBC: CPT | Performed by: HOSPITALIST

## 2018-01-09 PROCEDURE — 97162 PT EVAL MOD COMPLEX 30 MIN: CPT

## 2018-01-09 PROCEDURE — 77030027138 HC INCENT SPIROMETER -A

## 2018-01-09 PROCEDURE — 65270000029 HC RM PRIVATE

## 2018-01-09 PROCEDURE — 97110 THERAPEUTIC EXERCISES: CPT

## 2018-01-09 PROCEDURE — 77030020263 HC SOL INJ SOD CL0.9% LFCR 1000ML

## 2018-01-09 PROCEDURE — 36415 COLL VENOUS BLD VENIPUNCTURE: CPT | Performed by: HOSPITALIST

## 2018-01-09 PROCEDURE — 74011250636 HC RX REV CODE- 250/636: Performed by: FAMILY MEDICINE

## 2018-01-09 RX ORDER — HYDROMORPHONE HYDROCHLORIDE 2 MG/ML
0.5 INJECTION, SOLUTION INTRAMUSCULAR; INTRAVENOUS; SUBCUTANEOUS
Status: DISCONTINUED | OUTPATIENT
Start: 2018-01-09 | End: 2018-01-11 | Stop reason: HOSPADM

## 2018-01-09 RX ORDER — IBUPROFEN 200 MG
1 TABLET ORAL DAILY
Status: DISCONTINUED | OUTPATIENT
Start: 2018-01-09 | End: 2018-01-11 | Stop reason: HOSPADM

## 2018-01-09 RX ADMIN — CLINDAMYCIN PHOSPHATE 600 MG: 12 INJECTION, SOLUTION INTRAMUSCULAR; INTRAVENOUS at 08:00

## 2018-01-09 RX ADMIN — Medication 10 ML: at 22:22

## 2018-01-09 RX ADMIN — ENOXAPARIN SODIUM 40 MG: 40 INJECTION SUBCUTANEOUS at 08:04

## 2018-01-09 RX ADMIN — MORPHINE SULFATE 2 MG: 2 INJECTION, SOLUTION INTRAMUSCULAR; INTRAVENOUS at 11:45

## 2018-01-09 RX ADMIN — SODIUM CHLORIDE 100 ML/HR: 900 INJECTION, SOLUTION INTRAVENOUS at 05:32

## 2018-01-09 RX ADMIN — CLINDAMYCIN PHOSPHATE 600 MG: 12 INJECTION, SOLUTION INTRAMUSCULAR; INTRAVENOUS at 00:16

## 2018-01-09 RX ADMIN — Medication 10 ML: at 05:33

## 2018-01-09 RX ADMIN — OXYCODONE HYDROCHLORIDE AND ACETAMINOPHEN 1 TABLET: 5; 325 TABLET ORAL at 17:20

## 2018-01-09 RX ADMIN — OXYCODONE HYDROCHLORIDE AND ACETAMINOPHEN 1 TABLET: 5; 325 TABLET ORAL at 05:33

## 2018-01-09 RX ADMIN — CLINDAMYCIN PHOSPHATE 600 MG: 12 INJECTION, SOLUTION INTRAMUSCULAR; INTRAVENOUS at 17:20

## 2018-01-09 RX ADMIN — OXYCODONE HYDROCHLORIDE AND ACETAMINOPHEN 1 TABLET: 5; 325 TABLET ORAL at 22:21

## 2018-01-09 NOTE — PROGRESS NOTES
Problem: Falls - Risk of  Goal: *Absence of Falls  Document Rick Fall Risk and appropriate interventions in the flowsheet.    Outcome: Progressing Towards Goal  Fall Risk Interventions:            Medication Interventions: Assess postural VS orthostatic hypotension, Evaluate medications/consider consulting pharmacy, Patient to call before getting OOB, Teach patient to arise slowly    Elimination Interventions: Call light in reach, Patient to call for help with toileting needs, Toilet paper/wipes in reach, Toileting schedule/hourly rounds    History of Falls Interventions: Bed/chair exit alarm, Door open when patient unattended, Evaluate medications/consider consulting pharmacy, Investigate reason for fall, Room close to nurse's station

## 2018-01-09 NOTE — PERIOP NOTES
TRANSFER - OUT REPORT:    Verbal report given to boyd lockwood(name) on Martha Ladd  being transferred to 2210(unit) for routine post - op       Report consisted of patients Situation, Background, Assessment and   Recommendations(SBAR). Information from the following report(s) SBAR, OR Summary, Intake/Output and MAR was reviewed with the receiving nurse. Lines:   Peripheral IV 01/07/18 Left Antecubital (Active)   Site Assessment Clean, dry, & intact 1/8/2018  8:00 PM   Phlebitis Assessment 0 1/8/2018  8:00 PM   Infiltration Assessment 0 1/8/2018  8:00 PM   Dressing Status Clean, dry, & intact 1/8/2018  8:00 PM   Dressing Type Transparent;Tape 1/8/2018  8:00 PM   Hub Color/Line Status Green; Infusing 1/8/2018  8:00 PM   Action Taken Open ports on tubing capped 1/8/2018  8:06 AM   Alcohol Cap Used Yes 1/8/2018  1:40 PM        Opportunity for questions and clarification was provided.       Patient transported with:   Registered Nurse

## 2018-01-09 NOTE — ANESTHESIA POSTPROCEDURE EVALUATION
Post-Anesthesia Evaluation and Assessment    Patient: Annelise Yang MRN: [id number]  SSN: [id number]    YOB: 1940  Age: 65 y.o. Sex: male      Data from PACU flowsheet    Cardiovascular Function/Vital Signs  Visit Vitals    /65    Pulse 92    Temp 37 °C (98.6 °F)    Resp 14    Ht 5' 10\" (1.778 m)    Wt 63.5 kg (140 lb)    SpO2 97%    BMI 20.09 kg/m2       Patient is status post general anesthesia for Procedure(s):  RIGHT HIP BIPOLAR HEMIARTHROPLASTY. Nausea/Vomiting: controlled    Postoperative hydration reviewed and adequate. Pain:  Pain Scale 1: Numeric (0 - 10) (01/08/18 1954)  Pain Intensity 1: 0 (01/08/18 1954)   Managed      Mental Status and Level of Consciousness: Alert and oriented     Pulmonary Status:   O2 Device: Room air (01/08/18 1954)   Adequate oxygenation and airway patent    Complications related to anesthesia: None    Post-anesthesia assessment completed.  No concerns    Signed By: Montrell Carmichael MD     January 8, 2018

## 2018-01-09 NOTE — PROGRESS NOTES
OT order received & chart reviewed. 1324: 1st attempt; PT working with pt. Will follow up.     Elias Alvarado MS OTR/L  Office Ext: [phone]  Pager: [phone]

## 2018-01-09 NOTE — PROGRESS NOTES
Problem: Mobility Impaired (Adult and Pediatric)  Goal: *Acute Goals and Plan of Care (Insert Text)  Physical Therapy Goals  Initiated 1/9/2018 and to be accomplished within 7 day(s)  1. Patient will move from supine to sit and sit to supine , scoot up and down and roll side to side in bed with modified independence. 2.  Patient will transfer from bed to chair and chair to bed with modified independence using the least restrictive device. 3.  Patient will perform sit to stand with modified independence. 4.  Patient will ambulate with modified independence for 300 feet with the least restrictive device. 5.  Patient will ascend/descend 4 stairs with  handrail(s) with modified independence to egress home . Outcome: Progressing Towards Goal  physical Therapy EVALUATION    Patient: Selam Gomez (39 y.o. male)  Date: 1/9/2018  Primary Diagnosis: Hip fracture, right ([address]  Hip fracture, right (HCC)  right hip fracture  Procedure(s) (LRB):  RIGHT HIP BIPOLAR HEMIARTHROPLASTY (Right) 1 Day Post-Op   Precautions:   Fall, WBAT    PROBLEM LIST:  Patient presents with the following problems:   Bed Mobility, Transfers, Gait, Strength, Balance, Stairs and Precautions  ASSESSMENT :   Patient requires between maximal assistance and minimal assistance/contact guard assist for bed mobility, transfers and ambulation. patient  Limited weight on right leg due to pain. Able to ambulate 4 feet to chair. Pain reported 5/10 post and 2/10 pre.  education on safety, plan of care and weightbearing status with gait. Needs reinforcement. Patient will benefit from skilled intervention to address the above impairments.   Patients rehabilitation potential is considered to be Fair  Factors which may influence rehabilitation potential include:   []         None noted  []         Mental ability/status  [x]         Medical condition  []         Home/family situation and support systems  []         Safety awareness  []         Pain tolerance/management  []         Other:      PLAN :  Recommendations and Planned Interventions:  [x]           Bed Mobility Training             [x]    Neuromuscular Re-Education  [x]           Transfer Training                   []    Orthotic/Prosthetic Training  [x]           Gait Training                          []    Modalities  [x]           Therapeutic Exercises          []    Edema Management/Control  [x]           Therapeutic Activities            [x]    Patient and Family Training/Education  []           Other (comment):    Frequency/Duration: Patient will be followed by physical therapy 1-2 times per day/4-7 days per week to address goals. Discharge Recommendations: [facility]  Further Equipment Recommendations for Discharge: bedside commode ? and rolling walker     SUBJECTIVE:   Patient stated .    OBJECTIVE DATA SUMMARY:   History reviewed. No pertinent past medical history. History reviewed. No pertinent surgical history. Barriers to Learning/Limitations: yes;  sensory deficits-vision/hearing/speech  Compensate with: visual, verbal, tactile, kinesthetic cues/model    G CODES:Mobility  Current  CL= 60-79%   Goal  CI= 1-19%. The severity rating is based on the [facility] Balance Scale2/5   [facility] Standing Balance Scale2/5  0: Pt performs 25% or less of standing activity (Max assist) CN, 100% impaired. 1: Pt supports self with upper extremities but requires therapist assistance. Pt performs 25-50% of effort (Mod assist) CM, 80% to <100% impaired. 1+: Pt supports self with upper extremities but requires therapist assistance. Pt performs >50% effort. (Min assist). CL, 60% to <80% impaired. 2: Pt supports self independently with both upper extremities (walker, crutches, parallel bars). CL, 60% to <80% impaired. 2+: Pt support self independently with 1 upper extremity (cane, crutch, 1 parallel bar). CK, 40% to <60% impaired.   3: Pt stands without upper extremity support for up to 30 seconds. CK, 40% to <60% impaired. 3+: Pt stands without upper extremity support for 30 seconds or greater. CJ, 20% to <40% impaired. 4: Pt independently moves and returns center of gravity 1-2 inches in one plane. CJ, 20% to <40% impaired. 4+: Pt independently moves and returns center of gravity 1-2 inches in multiple planes. CI, 1% to <20% impaired. 5: Pt independently moves and returns center of gravity in all planes greater than 2 inches. CH, 0% impaired. Eval Complexity: History: MEDIUM  Complexity : 1-2 comorbidities / personal factors will impact the outcome/ POC Exam:MEDIUM Complexity : 3 Standardized tests and measures addressing body structure, function, activity limitation and / or participation in recreation  Presentation: MEDIUM Complexity : Evolving with changing characteristics  Clinical Decision Making:Medium Complexity [facility] Standing Balance Scale2/5 Overall Complexity:MEDIUM    Prior Level of Function/Home Situation: I  With adl's and ambulation without asssitive device. Home Situation  Home Environment: Private residence  # Steps to Enter: 4  Rails to Enter: Yes  One/Two Story Residence: Two story  # of Interior Steps: 14  Height of Each Step (in): 6 inches  Interior Rails: Left  Lift Chair Available: No  Living Alone: Yes  Support Systems: Friends \ neighbors  Patient Expects to be Discharged to[de-identified] Rehabilitation facility  Current DME Used/Available at Home: None  Critical Behavior:  Neurologic State: Alert  Orientation Level: Oriented X4  Cognition: Follows commands  Safety/Judgement: Fall prevention  Psychosocial  Patient Behaviors: Calm; Cooperative  Purposeful Interaction: Yes  Pt Identified Daily Priority: Clinical issues (comment)  Caritas Process: Nurture loving kindness;Establish trust;Attend basic human needs; Supportive expression  Caring Interventions: Reassure; Therapeutic modalities  Reassure:  Therapeutic listening; Informing; Instilling reanna and hope;Caring rounds  Therapeutic Modalities: Humor; Intentional therapeutic touch  Skin Condition/Temp: Warm     Skin Integrity: Incision (comment) (RLE)  Skin Integumentary  Skin Color: Appropriate for ethnicity  Skin Condition/Temp: Warm  Skin Integrity: Incision (comment) (RLE)  Turgor: Non-tenting  Hair Growth: Present  Varicosities: Absent  Strength:    Strength: Generally decreased, functional (3+/5 both legs)  Tone & Sensation:   Tone: Normal  Sensation: Intact  Range Of Motion:  AROM: Generally decreased, functional  PROM: Within functional limits  Functional Mobility:  Bed Mobility:  Supine to Sit: Moderate assistance; Additional time;Assist x1  Sit to Supine:  (left in chair )  Transfers:  Sit to Stand: Moderate assistance;Minimum assistance; Additional time;Assist x2  Stand to Sit: Minimum assistance; Moderate assistance;Assist x2; Additional time  Balance:   Sitting: Impaired  Sitting - Static: Fair (occasional)  Sitting - Dynamic: Fair (occasional)  Standing: Impaired  Standing - Static: Poor; Fair  Standing - Dynamic :  (poor plus )  Ambulation/Gait Training:  Distance (ft): 4 Feet (ft)  Assistive Device: Walker, rolling  Ambulation - Level of Assistance: Minimal assistance;Assist x2; Additional time  Gait Description (WDL): Exceptions to WDL  Gait Abnormalities: Antalgic;Decreased step clearance; Step to gait; Path deviations  Base of Support: Center of gravity altered;Shift to left  Speed/Latesha: Delayed;Pace decreased (<100 feet/min); Slow  Step Length: Left shortened;Right shortened  Interventions: Safety awareness training;Verbal cues; Visual/Demos  Therapeutic Exercises: Ankle pumps   Pain:  Pre treatment pain level:2  Post treatment pain level:5  Pain Scale 1: Numeric (0 - 10)  Activity Tolerance:   Fair   Please refer to the flowsheet for vital signs taken during this treatment.   After treatment:   [x]         Patient left in no apparent distress sitting up in chair  []         Patient left in no apparent distress in bed  [x]         Call bell left within reach  [x]         Nursing notified  []         Caregiver present  []         Bed alarm activated    COMMUNICATION/EDUCATION:   [x]         Fall prevention education was provided and the patient/caregiver indicated understanding. [x]         Patient/family have participated as able in goal setting and plan of care. [x]         Patient/family agree to work toward stated goals and plan of care. []         Patient understands intent and goals of therapy, but is neutral about his/her participation. []         Patient is unable to participate in goal setting and plan of care. Patient educated on the role of physical therapy during the acute stay  and the importance of mobility. VU. Needs reinforcement.      Thank you for this referral.  Bird Connor, PT   Time Calculation: 25 mins

## 2018-01-09 NOTE — PROGRESS NOTES
Problem: Mobility Impaired (Adult and Pediatric)  Goal: *Acute Goals and Plan of Care (Insert Text)  Physical Therapy Goals  Initiated 1/9/2018 and to be accomplished within 7 day(s)  1. Patient will move from supine to sit and sit to supine , scoot up and down and roll side to side in bed with modified independence. 2.  Patient will transfer from bed to chair and chair to bed with modified independence using the least restrictive device. 3.  Patient will perform sit to stand with modified independence. 4.  Patient will ambulate with modified independence for 300 feet with the least restrictive device. 5.  Patient will ascend/descend 4 stairs with  handrail(s) with modified independence to egress home . Outcome: Progressing Towards Goal  physical Therapy TREATMENT    Patient: Nita Kc (54 y.o. male)  Date: 1/9/2018  Diagnosis: Hip fracture, right ([address]  Hip fracture, right ([address]  right hip fracture Hip fracture, right ([facility])  Procedure(s) (LRB):  RIGHT HIP BIPOLAR HEMIARTHROPLASTY (Right) 1 Day Post-Op  Precautions: Fall, WBAT  Chart, physical therapy assessment, plan of care and goals were reviewed. ASSESSMENT:  patient reported pain at 9/10 in right hip and just back in bed. he also reported  wozzy from pain meds that he had taken. Agreed to complete exercise for right leg . Post pain 6/10  Education on exercise to do on own importance of mobility needs reinforcement. Ice left on right hip nursing informed. Progression toward goals:  []      Improving appropriately and progressing toward goals  [x]      Improving slowly and progressing toward goals  []      Not making progress toward goals and plan of care will be adjusted     PLAN:  Patient continues to benefit from skilled intervention to address the above impairments. Continue treatment per established plan of care.   Discharge Recommendations:  [facility]  Further Equipment Recommendations for Discharge:  rolling walker     SUBJECTIVE:   Patient stated .    OBJECTIVE DATA SUMMARY:   Critical Behavior:  Neurologic State: Alert  Orientation Level: Oriented X4  Cognition: Follows commands  Safety/Judgement: Fall prevention  Therapeutic Exercises: Ankle pumps, glut sets quad sets, assisted heel slides, assisted hip abd/add  Pain:  Pain Scale 1: Numeric (0 - 10)  Activity Tolerance:   Fair   Please refer to the flowsheet for vital signs taken during this treatment.   After treatment:   [] Patient left in no apparent distress sitting up in chair  [x] Patient left in no apparent distress in bed  [x] Call bell left within reach  [x] Nursing notified  [] Caregiver present  [] Bed alarm activated      Cheryl Dominguez, PT   Time Calculation: 15 mins

## 2018-01-09 NOTE — PROGRESS NOTES
Progress Note      Patient: Sulma Dela Cruz               Sex: male          DOA: 1/7/2018       YOB: 1940      Age:  65 y.o.        LOS:  LOS: 1 day               Subjective:   Pt was taken to surgery today and he is s/p right hip  Hemiarthroplasty theophylline pt has smoked one pack of cigarettes for 58 years . will need to monitor him fairly closely for any cardiac or pulmonary problems . Objective:      Visit Vitals    /72 (BP 1 Location: Right arm)    Pulse 92    Temp 98.7 °F (37.1 °C)    Resp 16    Ht 5' 10\" (1.778 m)    Wt 63.5 kg (140 lb)    SpO2 94%    BMI 20.09 kg/m2       Physical Exam:  Pt seen before surgery was awake and alert /   Heart reg rate and rhythm   Lungs coarse breath sounds heard   Abdomen soft and nontender   Extremities right hip fracture .   Neuro nonfocal     Lab/Data Reviewed:  BMP:   Lab Results   Component Value Date/Time     01/08/2018 03:10 AM    K 4.2 01/08/2018 03:10 AM     01/08/2018 03:10 AM    CO2 23 01/08/2018 03:10 AM    AGAP 11 01/08/2018 03:10 AM    GLU 94 01/08/2018 03:10 AM    BUN 18 01/08/2018 03:10 AM    CREA 0.73 01/08/2018 03:10 AM    GFRAA >60 01/08/2018 03:10 AM    GFRNA >60 01/08/2018 03:10 AM     CMP:   Lab Results   Component Value Date/Time     01/08/2018 03:10 AM    K 4.2 01/08/2018 03:10 AM     01/08/2018 03:10 AM    CO2 23 01/08/2018 03:10 AM    AGAP 11 01/08/2018 03:10 AM    GLU 94 01/08/2018 03:10 AM    BUN 18 01/08/2018 03:10 AM    CREA 0.73 01/08/2018 03:10 AM    GFRAA >60 01/08/2018 03:10 AM    GFRNA >60 01/08/2018 03:10 AM    CA 8.6 01/08/2018 03:10 AM     CBC:   Lab Results   Component Value Date/Time    WBC 12.0 01/08/2018 03:10 AM    HGB 13.7 01/08/2018 03:10 AM    HCT 40.5 01/08/2018 03:10 AM     01/08/2018 03:10 AM           Assessment/Plan     Principal Problem:    Hip fracture, right ([address] (1/7/2018)pt has been taken to surgery and he is s/p right hip  biplar hemiarthroplasty Active Problems:    Current smoker (1/7/2018)  S/p 57 year h/o cigarette smoking      Plan:will follow post op .labs   for the am

## 2018-01-09 NOTE — OP NOTES
[facility] DIVISION  OPERATIVE REPORT    Candy Mendoza  MR#: [id number]  : 1940  ACCOUNT #: [de-identified]   DATE OF SERVICE: 2018    PREOPERATIVE DIAGNOSIS:  Right hip femoral neck fracture, displaced. POSTOPERATIVE DIAGNOSIS:   Right hip femoral neck fracture, displaced. PROCEDURES PERFORMED:  Right hip bipolar hemiarthroplasty. SURGEON:  Efra Matute MD.     ANESTHESIA:  General.    COMPLICATIONS:  None. ESTIMATED BLOOD LOSS:  200 mL. SPECIMENS REMOVED:  Femoral head. IMPLANTS:  Exactech bipolar hemiarthroplasty size 15 press fit 2, 55 mm +0 bipolar head. INDICATION FOR PROCEDURE:  The patient is a pleasant 57-year-old white male with history of falls with right hip femoral neck fracture, displaced, and was brought to operating suite for management. Full informed consent to proceed was obtained. Risks and benefits explained in full including but not limited to bleeding, infection, damage, pain, stiffness, swelling, further surgery, revision surgery, incomplete resolution of symptoms  as well as medical complications. DESCRIPTION OF PROCEDURE:  The patient was brought to the operative suite, and placed in supine position. He was intubated with general endotracheal anesthesia. We then stabilized him in the lateral decubitus position, secured in hip fairchild on the bed. We then proceeded to prep and drape his right hip in normal sterile fashion. We proceeded to make a standard lateral incision, dissected down through the skin, identified the greater trochanter and dissected to the greater gluteus christopher proximally and the distal hip distally. We dissected down, identified the external rotators. Any bleeders were cauterized. Hematoma and bursa was excised. After this was done, we proceeded to take down the external rotators. Piriformis was identified and excised. This was detached from the piriformis fossa. This was tagged.   After we identified the capsule, a T-type incision was made to the capsule. We identified the femoral neck and  found to be fractured high just underneath the rim of the femoral neck. We then proceeded to place soft tissue protectors, a Speedy, and hip skid. We proceeded to utilize the flag to make a 1 cm cut just 10 mm proximal to the lesser trochanter. The cut was made, osteotome utilized to finish the cut, repeatedly identify the canal.  The  was utilized as well as a T-handle and the lateralizing reamer, then proceeded to ream and broach up to a size 15 mm stem. This was found to fit adequately in the appropriate amount of anteversion. After this was done, we turned our attention to the femoral head. The head was extracted utilizing the corkscrew. This was sized to 55 mm head. We proceeded to then place a +0 head and a 55 mm head. This was found to trial adequately with flexion to 90 degrees, internal rotation to 90 degrees without dislocation or subluxation. Adequate shuck and leg lengths were found to be intact and equal to contralateral side. After this was done, we identified the site and the final components. Pulsatile antibiotic lavage was utilized throughout the hip. The final stem was placed in standard fashion, press fit. We proceeded to place the final head. This was secured and the hip was reduced and was found to have the aforementioned parameters of range of motion and stability. After this was done, we proceeded to repair the capsule with a #2 FiberWire. After this was done, we proceeded to repair the piriformis to the piriformis fossa using #5 stitch in a standard fashion. After adequate repair, the external rotators were sutured back. We proceeded to close the fascia with 0 Vicryl stitch, subcutaneous closed with 2-0 Vicryl stitch. Skin was closed with staples. Dry sterile dressing was placed.   He was placed in a hip pillow, was awakened from anesthesia and brought to postoperative care in stable condition.       MD NI Guardado / CRISTAL  D: 01/08/2018 18:46     T: 01/08/2018 20:42  JOB #: [id number]

## 2018-01-09 NOTE — PROGRESS NOTES
TRANSFER - IN REPORT:    Verbal report received from Stacey Daily on [facility]  being received from [place] for routine post - op      Report consisted of patients Situation, Background, Assessment and   Recommendations(SBAR). Information from the following report(s) SBAR, OR Summary and Intake/Output was reviewed with the receiving nurse. Opportunity for questions and clarification was provided. Assessment completed upon patients arrival to unit and care assumed. Received pt via bed awake and alert. Dressing to R hip D/I, mills draining pale yellow urine. Wearing O2 at 2L via n/c. Oriented to call bell, phone and IS with pt giving return demonstration.

## 2018-01-09 NOTE — ROUTINE PROCESS
Received pt in bed just came back from PACU. Pt awake AOX 3. Dressing to right hip c/d/i. Mills catheter in placed. 2041 - Percocet given for pain. 2315 - snacks given to patient, ate 100%. 0200 - Patient resting quietly. 0533 - medicated pt for pain. 6590 - mills d/c without difficulty, pt tolerated well. 0730 - Bedside and Verbal shift change report given to Ramos Yan.UCHE (oncoming nurse) by Alina Fagan RN BSN (offgoing nurse). Report given with SBAR, Kardex, Intake/Output, MAR and Recent Results.

## 2018-01-09 NOTE — PROGRESS NOTES
Internal Medicine Progress Note    Patient's Name: Kalpana Houston  Admit Date: 1/7/2018  Length of Stay: 2      Assessment/Plan     Active Hospital Problems    Diagnosis Date Noted    Hip fracture, right ([address] 01/07/2018    Current smoker 01/07/2018     - Diet and mobilization per ortho  - Pain control PRN, change to dilaudid  - PT/OT  - Nicotine patch  - Cont acceptable home medications for chronic conditions   - DVT protocol    I have personally reviewed all pertinent labs and films that have officially resulted over the last 24 hours. I have personally checked for all pending labs that are awaiting final results. Subjective     Pt s/e @ bedside  No major events overnight  C/o R hip pain  Denies CP or SOB    Objective     Visit Vitals    /57 (BP 1 Location: Right arm, BP Patient Position: At rest)    Pulse 96    Temp 99.5 °F (37.5 °C)    Resp 22    Ht 5' 10\" (1.778 m)    Wt 63.5 kg (140 lb)    SpO2 97%    BMI 20.09 kg/m2       Physical Exam:  General Appearance: NAD, conversant  Lungs: CTA with normal respiratory effort  CV: RRR, no m/r/g  Abdomen: soft, non-tender, normal bowel sounds  Extremities: no cyanosis, no peripheral edema  Neuro: No focal deficits, motor/sensory intact    Lab/Data Reviewed:  BMP:   Lab Results   Component Value Date/Time     01/09/2018 03:46 AM    K 4.3 01/09/2018 03:46 AM     01/09/2018 03:46 AM    CO2 25 01/09/2018 03:46 AM    AGAP 9 01/09/2018 03:46 AM     (H) 01/09/2018 03:46 AM    BUN 17 01/09/2018 03:46 AM    CREA 0.78 01/09/2018 03:46 AM    GFRAA >60 01/09/2018 03:46 AM    GFRNA >60 01/09/2018 03:46 AM     CBC:   Lab Results   Component Value Date/Time    WBC 11.7 01/09/2018 03:46 AM    HGB 11.9 (L) 01/09/2018 03:46 AM    HCT 35.8 (L) 01/09/2018 03:46 AM     01/09/2018 03:46 AM       Imaging Reviewed:  No results found.     Medications Reviewed:  Current Facility-Administered Medications   Medication Dose Route Frequency    nicotine (NICODERM CQ) 14 mg/24 hr patch 1 Patch  1 Patch TransDERmal DAILY    sodium chloride (NS) flush 5-10 mL  5-10 mL IntraVENous Q8H    sodium chloride (NS) flush 5-10 mL  5-10 mL IntraVENous PRN    morphine injection 2 mg  2 mg IntraVENous Q4H PRN    oxyCODONE-acetaminophen (PERCOCET) 5-325 mg per tablet 1 Tab  1 Tab Oral Q4H PRN    clindamycin (CLEOCIN) 600mg D5W 50mL IVPB (premix)  600 mg IntraVENous Q8H    enoxaparin (LOVENOX) injection 40 mg  40 mg SubCUTAneous DAILY    oxyCODONE-acetaminophen (PERCOCET 10)  mg per tablet 1 Tab  1 Tab Oral Q4H PRN    0.9% sodium chloride infusion  100 mL/hr IntraVENous CONTINUOUS    naloxone (NARCAN) injection 0.4 mg  0.4 mg IntraVENous PRN           Mikhail Esposito DO  Internal Medicine, Hospitalist  Pager: [phone]  [address] [place] [address]

## 2018-01-09 NOTE — PROGRESS NOTES
Progress Note      Post Operative Day: 1    Assessment:    1. Status post  HIP HEMIARTHROPLASTY for Hip fracture, right ([address]  Hip fracture, right ([address]  right hip fracture 1/7/2018,   Progressing. PLAN:    1. Mobilize. Continue P.T.  2.  WBAT  3. Lovenox for DVT prophylaxis per ortho  4. Discharge Planning. HPI: Tan Jiménez is a 65 y.o. male patient without new complaints status post xena for Hip fracture, right ([address]  Hip fracture, right ([address]  right hip fracture 1/7/2018. No new orthopaedic changes. Blood pressure 110/69, pulse 90, temperature 98.9 °F (37.2 °C), resp. rate 22, height 5' 10\" (1.778 m), weight 63.5 kg (140 lb), SpO2 93 %. CBC w/Diff   Lab Results   Component Value Date/Time    WBC 11.7 01/09/2018 03:46 AM    RBC 3.90 (L) 01/09/2018 03:46 AM    HCT 35.8 (L) 01/09/2018 03:46 AM          Physical Assessment:  General: in no apparent distress   Wound: clean, dry   Extremities:  Neurovascular intact    Dressing:  Dry   DVT Exam:   No exam evidence to suggest DVT. Compartments soft and NT. Radiology: Right femoral neck fracture with superior lateral angulation and displacement,  minimally comminuted configuration with small osseous bodies of the superior  inferior aspect of the fracture. Intact femoral head. Intact visualized right  hemipelvis.          Yue Otero PA-C  1/9/2018  Office [phone]  Cell [phone]

## 2018-01-09 NOTE — PROGRESS NOTES
8700 mills dc'd by off going nurse, patient made aware of DTV status     0816 assessment complete, patient in bed eating breakfast, no acute distress noted, call bell within reach, patient report pain at tolerable level 2/10, am meds given, will reassess VS    0939 karen Oviedo awaiting call back     0946 spoke to Wendy MARTINEZ ok to dc BUN, CR clearance, and fecal occult blood    0949 karen Watson awaiting call back in reference to nicotine patch    1018 PT at bedside     1311 patient in bed, call bell within reach, no acute distress noted    1319 PT at bedside     1915 Bedside and Verbal shift change report given to [address] (oncoming nurse) by Cris Hu RN   (offgoing nurse). Report included the following information SBAR, Kardex and MAR.

## 2018-01-09 NOTE — PROGRESS NOTES
Problem: Falls - Risk of  Goal: *Absence of Falls  Document Rick Fall Risk and appropriate interventions in the flowsheet.    Outcome: Progressing Towards Goal  Fall Risk Interventions:  Mobility Interventions: Patient to call before getting OOB, Utilize walker, cane, or other assitive device         Medication Interventions: Patient to call before getting OOB, Teach patient to arise slowly, Assess postural VS orthostatic hypotension    Elimination Interventions: Call light in reach, Patient to call for help with toileting needs    History of Falls Interventions: Door open when patient unattended

## 2018-01-10 PROCEDURE — 77030020263 HC SOL INJ SOD CL0.9% LFCR 1000ML

## 2018-01-10 PROCEDURE — 65270000029 HC RM PRIVATE

## 2018-01-10 PROCEDURE — 97530 THERAPEUTIC ACTIVITIES: CPT

## 2018-01-10 PROCEDURE — 97165 OT EVAL LOW COMPLEX 30 MIN: CPT

## 2018-01-10 PROCEDURE — 74011250636 HC RX REV CODE- 250/636: Performed by: ORTHOPAEDIC SURGERY

## 2018-01-10 PROCEDURE — 74011250637 HC RX REV CODE- 250/637: Performed by: ORTHOPAEDIC SURGERY

## 2018-01-10 PROCEDURE — 77030010545

## 2018-01-10 PROCEDURE — 74011250637 HC RX REV CODE- 250/637: Performed by: HOSPITALIST

## 2018-01-10 PROCEDURE — 74011250636 HC RX REV CODE- 250/636: Performed by: FAMILY MEDICINE

## 2018-01-10 RX ORDER — OXYCODONE AND ACETAMINOPHEN 5; 325 MG/1; MG/1
1-2 TABLET ORAL
Qty: 60 TAB | Refills: 0 | Status: SHIPPED | OUTPATIENT
Start: 2018-01-10 | End: 2018-02-15

## 2018-01-10 RX ORDER — ENOXAPARIN SODIUM 100 MG/ML
40 INJECTION SUBCUTANEOUS DAILY
Qty: 14 SYRINGE | Refills: 0 | Status: SHIPPED | OUTPATIENT
Start: 2018-01-10 | End: 2018-02-15

## 2018-01-10 RX ADMIN — OXYCODONE HYDROCHLORIDE AND ACETAMINOPHEN 1 TABLET: 5; 325 TABLET ORAL at 11:21

## 2018-01-10 RX ADMIN — OXYCODONE HYDROCHLORIDE AND ACETAMINOPHEN 1 TABLET: 10; 325 TABLET ORAL at 05:53

## 2018-01-10 RX ADMIN — OXYCODONE HYDROCHLORIDE AND ACETAMINOPHEN 1 TABLET: 10; 325 TABLET ORAL at 23:57

## 2018-01-10 RX ADMIN — ENOXAPARIN SODIUM 40 MG: 40 INJECTION SUBCUTANEOUS at 09:15

## 2018-01-10 RX ADMIN — Medication 10 ML: at 23:57

## 2018-01-10 RX ADMIN — OXYCODONE HYDROCHLORIDE AND ACETAMINOPHEN 1 TABLET: 5; 325 TABLET ORAL at 16:11

## 2018-01-10 RX ADMIN — SODIUM CHLORIDE 100 ML/HR: 900 INJECTION, SOLUTION INTRAVENOUS at 06:11

## 2018-01-10 NOTE — PROGRESS NOTES
Discussed discharge plan with patient. I explained he has been accepted to [facility] and rehab,  He agrees with discharge pan. I have called and left a message for the admission director, Corey Jordan.  Riddhi Malhotra RN

## 2018-01-10 NOTE — PROGRESS NOTES
Problem: Self Care Deficits Care Plan (Adult)  Goal: *Acute Goals and Plan of Care (Insert Text)  Occupational Therapy Goals  Initiated 1/10/2018 within 7 day(s). 1.  Patient will perform grooming tasks while standing with contact guard assistance. 2.  Patient will perform lower body dressing with minimal assistance utilizing AE, prn.  3.  Patient will perform functional task in standing for 8 minutes with contact guard assistance for balance to increase activity tolerance for ADLs. 4.  Patient will perform toilet transfers with supervision/set-up. 5.  Patient will perform all aspects of toileting with supervision/set-up. 6.  Patient will participate in upper extremity therapeutic exercise/activities with supervision/set-up for 8 minutes to increase BUE strength for functional transfers & ADLs. 7.  Patient will utilize energy conservation techniques during functional activities with minimal verbal cues. Outcome: Progressing Towards Goal  Occupational Therapy EVALUATION    Patient: Gerald Austin (87 y.o. male)  Date: 1/10/2018  Primary Diagnosis: Hip fracture, right (HCC)  Hip fracture, right (HCC)  right hip fracture  Procedure(s) (LRB):  RIGHT HIP BIPOLAR HEMIARTHROPLASTY (Right) 2 Days Post-Op   Precautions:  Fall, WBAT  PLOF: Pt was independent with ADLs & IADLs PTA. ASSESSMENT :  Based on the objective data described below, the patient presents with impairments with regard to bed mobility, activity tolerance and independence in ADLs due to R hip fx. Pt in recliner chair on arrival, reports he is in \"excruciating pain\" in the chair; pt unable to reposition self. Functional transfer from recliner chair to EOB in prep for bathroom mobility & BSC transfer w/ min A x2 and additional time. Pt demo'd good carryover for RW/BLE skills from previous PTA session. Good safety awareness & fair- activity tolerance. Skilled instruction for BUE positioning during functional transfers for safety.  Max A x2 to return supine due to pain; pt reports 4/10 pain once made comfortable. Minh Hill RN aware. Needs left within reach. Recommend continued therapy to maximize independence in ADLs & functional mobility. Patient will benefit from skilled intervention to address the above impairments. Patients rehabilitation potential is considered to be Good  Factors which may influence rehabilitation potential include:   []             None noted  []             Mental ability/status  [x]             Medical condition  []             Home/family situation and support systems  []             Safety awareness  []             Pain tolerance/management  []             Other:        PLAN :  Recommendations and Planned Interventions:  [x]               Self Care Training                  [x]        Therapeutic Activities  [x]               Functional Mobility Training    []        Cognitive Retraining  [x]               Therapeutic Exercises           [x]        Endurance Activities  [x]               Balance Training                   []        Neuromuscular Re-Education  []               Visual/Perceptual Training     [x]   Home Safety Training  [x]               Patient Education                 [x]        Family Training/Education  []               Other (comment):    Frequency/Duration: Patient will be followed by occupational therapy 4-7 times a week to address goals. Discharge Recommendations: Christopher Rodriguez  Further Equipment Recommendations for Discharge: bedside commode, shower chair     SUBJECTIVE:   Patient stated I've never experienced pain like this.     OBJECTIVE DATA SUMMARY:   History reviewed. No pertinent past medical history. History reviewed. No pertinent surgical history. Barriers to Learning/Limitations: None  Compensate with: visual, verbal, tactile, kinesthetic cues/model    GCODES:  Self Care  Current  CL= 60-79%   Goal  CJ= 20-39%.   The severity rating is based on the Other Functional Assessment, MMT, ROM    Eval Complexity: History: LOW Complexity : Brief history review ; Examination: LOW Complexity : 1-3 performance deficits relating to physical, cognitive , or psychosocial skils that result in activity limitations and / or participation restrictions ; Decision Making:LOW Complexity : No comorbidities that affect functional and no verbal or physical assistance needed to complete eval tasks     Prior Level of Function/Home Situation: Pt was independent with ADLs & IADLs PTA. Home Situation  Home Environment: Private residence  # Steps to Enter: 4  Rails to Enter: Yes  One/Two Story Residence: Two story (has loft on 2nd floor)  # of Interior Steps: 14  Height of Each Step (in): 6 inches  Interior Rails: Left  Lift Chair Available: No  Living Alone: Yes  Support Systems: Friends \ neighbors  Patient Expects to be Discharged to[de-identified] Rehabilitation facility  Current DME Used/Available at Home: None  Tub or Shower Type: Shower (no grab bars or shower chair)  [x]  Right hand dominant   []  Left hand dominant    Cognitive/Behavioral Status:  Neurologic State: Alert  Orientation Level: Oriented X4  Cognition: Appropriate decision making; Appropriate for age attention/concentration; Appropriate safety awareness; Follows commands  Safety/Judgement: Awareness of environment; Fall prevention     Skin: Intact (BUEs)  Edema: None noted (BUEs)    Vision/Perceptual:    Acuity: Able to read clock/calendar on wall without difficulty      Coordination:  Coordination: Within functional limits (BUEs)  Fine Motor Skills-Upper: Right Intact; Left Intact    Gross Motor Skills-Upper: Right Intact; Left Intact     Balance:  Sitting: Impaired  Sitting - Static: Fair (occasional)  Sitting - Dynamic: Fair (occasional)  Standing: Impaired; With support  Standing - Static: Fair (-)  Standing - Dynamic : Fair (-)     Strength:  Strength: Generally decreased, functional (BUEs: 4/5)    Tone & Sensation:  Tone: Normal (BUEs)  Sensation: Intact (BUEs)    Range of Motion:  AROM: Generally decreased, functional (BUEs: approx 3/4 shoulder flexion)  PROM: Within functional limits (BUEs)    Functional Mobility and Transfers for ADLs:  Bed Mobility:  Supine to Sit:  (pt in chair on arrival)  Sit to Supine: Moderate assistance;Maximum assistance;Assist x2  Scooting: Additional time;Minimum assistance     Transfers:  Sit to Stand: Minimum assistance; Additional time    ADL Assessment:  Feeding: Setup;Supervision  Oral Facial Hygiene/Grooming: Setup;Supervision  Bathing: Maximum assistance  Upper Body Dressing: Minimum assistance  Lower Body Dressing: Maximum assistance  Toileting: Maximum assistance    Cognitive Retraining  Safety/Judgement: Awareness of environment; Fall prevention    Therapeutic Activity:  Functional transfer from recliner chair to EOB in prep for bathroom mobility & BSC transfer with min A x2 and additional time. Good safety awareness & fair- activity tolerance. Skilled instruction for BUE positioning during functional transfers for safety. Pain:  Pre treatment pain level: 5/10  Post treatment pain level: 4/10  Pain Scale 1: Numeric (0 - 10)  Pain Location 1: Hip  Pain Orientation 1: Right  Pain Description 1: Aching    Activity Tolerance:  Fair-  Please refer to the flowsheet for vital signs taken during this treatment. After treatment:   [] Patient left in no apparent distress sitting up in chair  [x] Patient left in no apparent distress in bed  [x] Call bell left within reach  [x] Nursing notified  [] Caregiver present  [] Bed alarm activated    COMMUNICATION/EDUCATION: Pt educated on role of OT and POC; he verbalized understanding. [x] Home safety education was provided and the patient/caregiver indicated understanding. [x] Patient/family have participated as able in goal setting and plan of care. [x] Patient/family agree to work toward stated goals and plan of care.   [] Patient understands intent and goals of therapy, but is neutral about his/her participation. [] Patient is unable to participate in goal setting and plan of care.     Thank you for this referral.    Ritu Selby MS OTR/L  Time Calculation: 19 mins

## 2018-01-10 NOTE — PROGRESS NOTES
Internal Medicine Progress Note    Patient's Name: Annelise Yang  Admit Date: 1/7/2018  Length of Stay: 3      Assessment/Plan     Active Hospital Problems    Diagnosis Date Noted    Hip fracture, right ([address] 01/07/2018    Current smoker 01/07/2018     - Diet and mobilization per ortho  - Pain control PRN  - PT/OT  - Nicotine patch  - Plan for d/c [facility]  - Cont acceptable home medications for chronic conditions   - DVT protocol    I have personally reviewed all pertinent labs and films that have officially resulted over the last 24 hours. I have personally checked for all pending labs that are awaiting final results. Subjective     Pt s/e @ bedside  No major events overnight  Pain better w/ switch in pain meds  Denies CP or SOB    Objective     Visit Vitals    /75 (BP 1 Location: Right arm, BP Patient Position: At rest)    Pulse 98    Temp 98 °F (36.7 °C)    Resp 22    Ht 5' 10\" (1.778 m)    Wt 63.5 kg (140 lb)    SpO2 92%    BMI 20.09 kg/m2       Physical Exam:  General Appearance: NAD, conversant  Lungs: CTA with normal respiratory effort  CV: RRR, no m/r/g  Abdomen: soft, non-tender, normal bowel sounds  Extremities: no cyanosis, no peripheral edema  Neuro: No focal deficits, motor/sensory intact    Lab/Data Reviewed:  BMP:   No results found for: NA, K, CL, CO2, AGAP, GLU, BUN, CREA, GFRAA, GFRNA  CBC:   No results found for: WBC, HGB, HGBEXT, HCT, HCTEXT, PLT, PLTEXT, HGBEXT, HCTEXT, PLTEXT    Imaging Reviewed:  No results found.     Medications Reviewed:  Current Facility-Administered Medications   Medication Dose Route Frequency    nicotine (NICODERM CQ) 14 mg/24 hr patch 1 Patch  1 Patch TransDERmal DAILY    HYDROmorphone (DILAUDID) injection 0.5 mg  0.5 mg IntraVENous Q3H PRN    sodium chloride (NS) flush 5-10 mL  5-10 mL IntraVENous Q8H    sodium chloride (NS) flush 5-10 mL  5-10 mL IntraVENous PRN    oxyCODONE-acetaminophen (PERCOCET) 5-325 mg per tablet 1 Tab  1 Tab Oral Q4H PRN  enoxaparin (LOVENOX) injection 40 mg  40 mg SubCUTAneous DAILY    oxyCODONE-acetaminophen (PERCOCET 10)  mg per tablet 1 Tab  1 Tab Oral Q4H PRN    0.9% sodium chloride infusion  100 mL/hr IntraVENous CONTINUOUS    naloxone (NARCAN) injection 0.4 mg  0.4 mg IntraVENous PRN           Yo Plascencia DO  Internal Medicine, Hospitalist  Pager: [facility]

## 2018-01-10 NOTE — PROGRESS NOTES
Problem: Falls - Risk of  Goal: *Absence of Falls  Document Rick Fall Risk and appropriate interventions in the flowsheet.    Outcome: Progressing Towards Goal  Fall Risk Interventions:  Mobility Interventions: Bed/chair exit alarm, Communicate number of staff needed for ambulation/transfer, Patient to call before getting OOB, PT Consult for mobility concerns, Strengthening exercises (ROM-active/passive), Utilize walker, cane, or other assitive device         Medication Interventions: Assess postural VS orthostatic hypotension, Bed/chair exit alarm, Patient to call before getting OOB, Teach patient to arise slowly    Elimination Interventions: Call light in reach, Elevated toilet seat, Patient to call for help with toileting needs, Toileting schedule/hourly rounds, Urinal in reach    History of Falls Interventions: Consult care management for discharge planning, Door open when patient unattended, Evaluate medications/consider consulting pharmacy, Room close to nurse's station

## 2018-01-10 NOTE — PROGRESS NOTES
Progress Note      Post Operative Day: 2    Assessment:    1. Status post  HIP HEMIARTHROPLASTY for Hip fracture, right ([address]  Hip fracture, right ([address]  right hip fracture 1/7/2018,   Progressing. PLAN:    1. Mobilize. Continue P.T.  2.  WBAT  3. Lovenox for DVT prophylaxis per ortho  4. Discharge Planning. Pennie Stanley for d/c to rehab from ortho standpoint. F/u in the office in 1 month. Staples out 10-14 days           HPI: Demario Mosqueda is a 65 y.o. male patient without new complaints status post xena for Hip fracture, right ([address]  Hip fracture, right ([address]  right hip fracture 1/7/2018. No new orthopaedic changes. Blood pressure 139/75, pulse 98, temperature 98 °F (36.7 °C), resp. rate 22, height 5' 10\" (1.778 m), weight 63.5 kg (140 lb), SpO2 92 %. CBC w/Diff   Lab Results   Component Value Date/Time    WBC 11.7 01/09/2018 03:46 AM    RBC 3.90 (L) 01/09/2018 03:46 AM    HCT 35.8 (L) 01/09/2018 03:46 AM          Physical Assessment:  General: in no apparent distress   Wound: clean, dry   Extremities:  Neurovascular intact    Dressing:  Dry   DVT Exam:   No exam evidence to suggest DVT.  Compartments soft and NT.               Belle Reeves PA-C  1/10/2018  Office [phone]  Cell [phone]

## 2018-01-10 NOTE — PROGRESS NOTES
Problem: Mobility Impaired (Adult and Pediatric)  Goal: *Acute Goals and Plan of Care (Insert Text)  Physical Therapy Goals  Initiated 1/9/2018 and to be accomplished within 7 day(s)  1. Patient will move from supine to sit and sit to supine , scoot up and down and roll side to side in bed with modified independence. 2.  Patient will transfer from bed to chair and chair to bed with modified independence using the least restrictive device. 3.  Patient will perform sit to stand with modified independence. 4.  Patient will ambulate with modified independence for 300 feet with the least restrictive device. 5.  Patient will ascend/descend 4 stairs with  handrail(s) with modified independence to egress home . Outcome: Progressing Towards Goal  physical Therapy TREATMENT    Patient: Love Florence (28 y.o. male)  Date: 1/10/2018  Diagnosis: Hip fracture, right ([address]  Hip fracture, right ([address]  right hip fracture Hip fracture, right ([facility])  Procedure(s) (LRB):  RIGHT HIP BIPOLAR HEMIARTHROPLASTY (Right) 2 Days Post-Op  Precautions: Fall, WBAT   Chart, physical therapy assessment, plan of care and goals were reviewed. PLOF: independent, ambulatory without an AD  ASSESSMENT:  Pt has difficulty getting OOB on either side of the bed. Increased time to carry out scooting closer to the edge before sitting up and moderate assist.  Ambulated 15ft with RW around bed, increased difficulty standing up straight, decreased step height and length, very slow pace, no LOB. Assisted back to supine with modA. Education: hooking tech to assist with moving RLE, UE placement for sit to stand, gt sequence  Progression toward goals:  []      Improving appropriately and progressing toward goals  [x]      Improving slowly and progressing toward goals  []      Not making progress toward goals and plan of care will be adjusted     PLAN:  Patient continues to benefit from skilled intervention to address the above impairments.   Continue treatment per established plan of care. Discharge Recommendations:  Christopher Rodriguez  Further Equipment Recommendations for Discharge:  rolling walker     SUBJECTIVE:   Patient stated I think I did better then this morning.     OBJECTIVE DATA SUMMARY:   Critical Behavior:  Neurologic State: Alert  Orientation Level: Oriented X4  Cognition: Appropriate decision making, Appropriate for age attention/concentration, Appropriate safety awareness, Follows commands  Safety/Judgement: Awareness of environment, Fall prevention  Functional Mobility Training:  Bed Mobility:  Supine to Sit: Additional time; Moderate assistance  Sit to Supine: Additional time; Moderate assistance  Scooting: Additional time; Moderate assistance  Transfers:  Sit to Stand: Minimum assistance; Additional time  Stand to Sit: Minimum assistance; Additional time  Balance:  Sitting: Impaired; With support  Sitting - Static: Fair (occasional)  Sitting - Dynamic: Fair (occasional) (-)  Standing: Impaired; With support  Standing - Static: Fair (-)  Standing - Dynamic : Fair (-)  Ambulation/Gait Training:  Distance (ft): 15 Feet (ft)  Assistive Device: Gait belt;Walker, rolling  Ambulation - Level of Assistance: Additional time;Contact guard assistance  Gait Abnormalities: Antalgic;Decreased step clearance;Shuffling gait; Step to gait  Base of Support: Shift to left  Stance: Left increased  Speed/Latesha: Delayed; Slow  Step Length: Right shortened;Left shortened  Swing Pattern: Right asymmetrical  Pain:  Pre:0  Post:8 when moving  Pain Scale 1: Numeric (0 - 10)    Activity Tolerance:   Fair(-)   Please refer to the flowsheet for vital signs taken during this treatment.   After treatment:   [] Patient left in no apparent distress sitting up in chair  [x] Patient left in no apparent distress in bed  [x] Call bell left within reach  [x] Nursing notified  [] Caregiver present  [] Bed alarm activated      Arturo Hope PTA   Time Calculation: 30 mins

## 2018-01-10 NOTE — PROGRESS NOTES
Certified [facility] provider rounded on Gerald Austin to provide education related to sleep apnea after chart review for risk factors. Risk factors include:  1. Mallampati II  2. STOP BANG score 4  3. [place] Sleepiness score 8    Provided patient with the following pamphlets:  1. What is Sleep Apnea  2. Sleep and Medical problems  3. Sleep & Your Heart  4. Common Sleep Problems for Older Adults  5. Tips For Sleep As You Age  10. Tips For Better Sleep In Older Adults    Patient Education:  1. Reviewed sleep hygiene & effects of poor sleep quality. 2. Reviewed relationship between sleep & heart health. 3. Reviewed relationship between sleep & age. 4. Reviewed relationship between sleep & weight. Recommendations:  1. Referral to sleep specialist for evaluation if symptoms of poor sleep quality present. 2. Order baseline PSG testing to be arranged as an outpatient. 3. Follow up with sleep specialist for treatment and management.

## 2018-01-10 NOTE — PROGRESS NOTES
0740 Received pt resting on bed, dressing on right hip dry and intact. Pt alert and oriented x 4, pain under control. All extremities moving, can wiggle tos and fingers, no numbness no tingling. Kep on hip precaution with pillow in between side of the bed and right hip. Call bell within reach. 1100 Up to the chair and back to bed, activity well tolerated. As per pt no dizziness, pain was at tolerable level during ambulation. Regular diet well tolerated, no difficulty swallowing, no nausea,no vomiting. 1800 Pt walked in the room, activity well tolerated. Pain under control. Kept in hip precaution.

## 2018-01-10 NOTE — PROGRESS NOTES
Problem: Mobility Impaired (Adult and Pediatric)  Goal: *Acute Goals and Plan of Care (Insert Text)  Physical Therapy Goals  Initiated 2018 and to be accomplished within 7 day(s)  1. Patient will move from supine to sit and sit to supine , scoot up and down and roll side to side in bed with modified independence. 2.  Patient will transfer from bed to chair and chair to bed with modified independence using the least restrictive device. 3.  Patient will perform sit to stand with modified independence. 4.  Patient will ambulate with modified independence for 300 feet with the least restrictive device. 5.  Patient will ascend/descend 4 stairs with  handrail(s) with modified independence to egress home . Outcome: Progressing Towards Goal  physical Therapy TREATMENT    Patient: Claudell Hagedorn (26 y.o. male)  Date: 1/10/2018  Diagnosis: Hip fracture, right ([address]  Hip fracture, right ([address]  right hip fracture Hip fracture, right ([facility])  Procedure(s) (LRB):  RIGHT HIP BIPOLAR HEMIARTHROPLASTY (Right) 2 Days Post-Op  Precautions: Fall, WBAT   Chart, physical therapy assessment, plan of care and goals were reviewed. PLOF:independent, no AD for ambulation  ASSESSMENT: Reports hallucinations from morphine. Pt required a lot of time to carry out bed mobility and modA. Reports lightheadedness upon sitting up, sat EOB for couple of minutes until subsided. Bed elevated to assist with sit to stand and VC for UE placement. Ambulated 6 ft with RW to chair, unable to stand fully erect, very short steps (B)LE,  step height, shuffling on RLE. No LOB. Pt left sitting in chair, nurse arrived to provide meds.      Education: UE placement for bed mobs and sit<>stand, gt sequence, RW mgmt and safety  Progression toward goals:  []      Improving appropriately and progressing toward goals  [x]      Improving slowly and progressing toward goals  []      Not making progress toward goals and plan of care will be adjusted PLAN:  Patient continues to benefit from skilled intervention to address the above impairments. Continue treatment per established plan of care. Discharge Recommendations:  Christopher Rodriguez  Further Equipment Recommendations for Discharge:  rolling walker     SUBJECTIVE:   Patient stated I can't move this leg.     OBJECTIVE DATA SUMMARY:   Critical Behavior:  Neurologic State: Alert  Orientation Level: Oriented X4  Cognition: Appropriate decision making, Appropriate for age attention/concentration, Follows commands  Safety/Judgement: Fall prevention  Functional Mobility Training:  Bed Mobility:  Supine to Sit: Additional time; Moderate assistance  Scooting: Additional time;Minimum assistance  Transfers:  Sit to Stand: Minimum assistance; Additional time  Stand to Sit: Minimum assistance; Additional time  Balance:  Sitting: Impaired  Sitting - Static: Fair (occasional)  Sitting - Dynamic: Fair (occasional)  Standing: Impaired; With support  Standing - Static: Fair (-)  Standing - Dynamic : Fair (-)  Ambulation/Gait Training:  Distance (ft): 6 Feet (ft)  Assistive Device: Gait belt;Walker, rolling  Ambulation - Level of Assistance: Additional time;Contact guard assistance  Gait Abnormalities: Antalgic;Decreased step clearance;Shuffling gait; Step to gait  Stance: Left increased  Speed/Latesha: Delayed; Shuffled; Slow  Step Length: Left shortened;Right shortened  Swing Pattern: Right asymmetrical  Pain:  Pre:2  Post:4  Pain Scale 1: Numeric (0 - 10)  Pain Intensity 1: 4  Pain Location 1: Hip  Pain Orientation 1: Right  Pain Description 1: Aching    Activity Tolerance:   Fair(-)  Please refer to the flowsheet for vital signs taken during this treatment.   After treatment:   [x] Patient left in no apparent distress sitting up in chair  [] Patient left in no apparent distress in bed  [x] Call bell left within reach  [] Nursing notified  [] Caregiver present  [] Bed alarm activated      Perez Clayton PTA Time Calculation: 23 mins

## 2018-01-10 NOTE — ROUTINE PROCESS
Assumed care of patient at 1917 report received from Shine SCHAEFER RN.  2041 - medicated pt for pain. 2230 - Pt resting quietly. 56 - medicated pt for pain. 1932 - Bedside and Verbal shift change report given to Chip Hodges and Sheng Darden RN (oncoming nurse) by Mena Mccullough RN BSN (offgoing nurse). Report given with SBAR, Kardex, Intake/Output, MAR and Recent Results.

## 2018-01-11 VITALS
OXYGEN SATURATION: 96 % | WEIGHT: 140 LBS | HEART RATE: 84 BPM | DIASTOLIC BLOOD PRESSURE: 75 MMHG | BODY MASS INDEX: 20.04 KG/M2 | HEIGHT: 70 IN | SYSTOLIC BLOOD PRESSURE: 123 MMHG | TEMPERATURE: 99.3 F | RESPIRATION RATE: 15 BRPM

## 2018-01-11 PROCEDURE — 74011250637 HC RX REV CODE- 250/637: Performed by: HOSPITALIST

## 2018-01-11 PROCEDURE — 74011250637 HC RX REV CODE- 250/637: Performed by: ORTHOPAEDIC SURGERY

## 2018-01-11 PROCEDURE — 74011250636 HC RX REV CODE- 250/636: Performed by: FAMILY MEDICINE

## 2018-01-11 PROCEDURE — 97530 THERAPEUTIC ACTIVITIES: CPT

## 2018-01-11 PROCEDURE — 74011250636 HC RX REV CODE- 250/636: Performed by: ORTHOPAEDIC SURGERY

## 2018-01-11 RX ORDER — IBUPROFEN 200 MG
1 TABLET ORAL DAILY
Qty: 30 PATCH | Refills: 0 | Status: SHIPPED
Start: 2018-01-12 | End: 2018-02-11

## 2018-01-11 RX ADMIN — OXYCODONE HYDROCHLORIDE AND ACETAMINOPHEN 1 TABLET: 5; 325 TABLET ORAL at 10:55

## 2018-01-11 RX ADMIN — OXYCODONE HYDROCHLORIDE AND ACETAMINOPHEN 1 TABLET: 5; 325 TABLET ORAL at 14:46

## 2018-01-11 RX ADMIN — ENOXAPARIN SODIUM 40 MG: 40 INJECTION SUBCUTANEOUS at 08:55

## 2018-01-11 RX ADMIN — Medication 10 ML: at 06:00

## 2018-01-11 RX ADMIN — SODIUM CHLORIDE 100 ML/HR: 900 INJECTION, SOLUTION INTRAVENOUS at 03:07

## 2018-01-11 NOTE — PROGRESS NOTES
0720 Received pt resting on bed, alert and oriented x 4. Pt is with condom catheter with episodes of incontinence last night as reported by outgoing nurse. Not in distress, no pain as per pt. Placed pillow on the right side of the hip against the bed rail, folded blanket in between legs. Instructed to use IS.     1430 Dressing changed, incision with staples dry and intact, no redness, no swelling. Pt pain medicated, mepilex applied at the back, no skin breakdown noted. Pt is alert and oriented, no difficulty breathting, extremities all moving, can wiggle toes. Right feet can wiggle toes, no numbness no tingling. 1530 Medicated for pain prior to transfer. IV site no sign of infiltration. Transferred to [facility].

## 2018-01-11 NOTE — ANCILLARY DISCHARGE INSTRUCTIONS
Patient and/or next of kin has been given the [facility] Important Message From Medicare About Your Rights\" letter and all questions were answered.

## 2018-01-11 NOTE — PROGRESS NOTES
Danelle 15 voiding , pain under control, discharge instructions given by marcus Austin RN, original dressing removed as ordered, staples intact, incision looks clean slightly puffy around incision, report given to Markus Faye. Pt aware of the transfer. To be medicated before transfer. No skin breakdown.  +

## 2018-01-11 NOTE — PROGRESS NOTES
2000: Bedside verbal shift report received from Johns Lazo [place], RN. Pt is awake in bed, no signs of distress, instructed to press call light if assistance is needed, call light within reach. 0730: Bedside and Verbal shift change report given to Bryan Bragg RN (oncoming nurse) by Shasta Ballard RN (offgoing nurse). Report included the following information SBAR, Kardex, Intake/Output, MAR and Recent Results.

## 2018-01-11 NOTE — DISCHARGE SUMMARY
Internal Medicine Discharge Summary        Patient: Martha Ladd    YOB: 1940    Age:  65 y.o. Admit Date: 1/7/2018    Discharge Date: 1/11/2018    LOS:  LOS: 4 days     Discharge To: SNF    Consults: Orthopedics    Admission Diagnoses: Hip fracture, right ([address]  Hip fracture, right ([address]  right hip fracture    Discharge Diagnoses:    Problem List as of 1/11/2018  Date Reviewed: 1/8/2018          Codes Class Noted - Resolved    * (Principal)Hip fracture, right ([address] ICD-10-CM: S72.001A  ICD-9-CM: 820.8  1/7/2018 - Present        Current smoker ICD-10-CM: F17.200  ICD-9-CM: 305.1  1/7/2018 - Present        Hematuria ICD-10-CM: R31.9  ICD-9-CM: 599.70  5/4/2012 - Present        Adrenal adenoma ICD-10-CM: D35.00  ICD-9-CM: 227.0  5/4/2012 - Present        BPH (benign prostatic hyperplasia) ICD-10-CM: N40.0  ICD-9-CM: 600.00  5/4/2012 - Present        Bladder diverticulum ICD-10-CM: N32.3  ICD-9-CM: 596.3  3/26/2012 - Present              Discharge Condition:  Improved    Procedures: Right hip hemiarthroplasty         HPI:    Martha Ladd is a 65 y.o. male who presents with right hip pain following a fall around 5 pm in the snow. Patient reports that he slipped on an icy surface and fell. He landed on his right hip and had instant severe pain 10/10. He denies LOC or hitting head. In the ED XR hip shows femoral neck fracture. Patient is hemodynamically stable. Orthopedic surgery was consulted by ED and plan for OR visit in the morning. Hospital Course (Including Significant Findings): The patient was admitted to the hospital for further management of their presenting condition. He was taken to the OR by orthopedic surgery for a right hip hemiarthroplasty. He tolerated the procedure well. PT evaluated the patient and he will need rehab. He needs to continue lovenox for 14 days. Plan for staple removal in 10-14 days and to follow up with ortho in 1 month.  The rest of the patient's chronic conditions were managed appropriately during their admission. They were medically stable at the time of discharge. Visit Vitals    /80 (BP 1 Location: Right arm, BP Patient Position: At rest)    Pulse 72    Temp 99 °F (37.2 °C)    Resp 15    Ht 5' 10\" (1.778 m)    Wt 63.5 kg (140 lb)    SpO2 95%    BMI 20.09 kg/m2       Physical Exam at Discharge:  General Appearance: NAD, conversant  HENT: normocephalic/atraumatic, moist mucus membranes  Lungs: CTA with normal respiratory effort  CV: RRR, no m/r/g  Abdomen: soft, non-tender, normal bowel sounds  Extremities: no cyanosis, no peripheral edema  Neuro: moves all extremities, no focal deficits  Psych: appropriate affect, alert and oriented to person, place and time    Labs Prior to Discharge:  Labs: Results:       Chemistry Recent Labs      01/09/18 0346 01/08/18 2048   GLU  199*   --    NA  140   --    K  4.3   --    CL  106   --    CO2  25   --    BUN  17  15   CREA  0.78   --    CA  8.0*   --    AGAP  9   --    BUCR  22*   --    AP  80   --    TP  5.8*   --    ALB  2.8*   --    GLOB  3.0   --    AGRAT  0.9   --       CBC w/Diff Recent Labs      01/09/18 0346 01/08/18 2048   WBC  11.7  13.4*   RBC  3.90*  4.29*   HGB  11.9*  13.1   HCT  35.8*  39.4   PLT  213  229   GRANS  88*   --    LYMPH  5*   --    EOS  0   --       Cardiac Enzymes No results for input(s): CPK, CKND1, WESLEY in the last 72 hours. No lab exists for component: CKRMB, TROIP   Coagulation No results for input(s): PTP, INR, APTT in the last 72 hours. No lab exists for component: INREXT    Lipid Panel No results found for: CHOL, CHOLPOCT, CHOLX, CHLST, CHOLV, 261579, HDL, LDL, LDLC, DLDLP, 144792, VLDLC, VLDL, TGLX, TRIGL, TRIGP, TGLPOCT, CHHD, CHHDX   BNP No results for input(s): BNPP in the last 72 hours.    Liver Enzymes Recent Labs      01/09/18   0346   TP  5.8*   ALB  2.8*   AP  80   SGOT  17      Thyroid Studies No results found for: T4, T3U, TSH, TSHEXT         Significant Imaging:  Xr Hip Rt W Or Wo Pelv 2-3 Vws    Result Date: 1/8/2018  EXAM: Hip Two Views Indication: Fall on ice, right hip pain. Technique: Frontal and crosstable lateral views of the right hip. Comparison studies: None. _____________ FINDINGS: Right femoral neck fracture with superior lateral angulation and displacement, minimally comminuted configuration with small osseous bodies of the superior inferior aspect of the fracture. Intact femoral head. Intact visualized right hemipelvis. _____________    IMPRESSION: 1. Right femoral neck fracture deformity with, superior lateral angulation. Xr Chest Port    Result Date: 1/8/2018  EXAM:Chest X-Ray  History: Chest pain, shortness of breath, fall with right hip pain and right femoral neck fracture. Technique:  Portable Frontal View Comparison: none _______________ FINDINGS: The trachea is midline. The cardiomediastinal silhouette is midline and, within normal limits. Pulmonary hyperinflation with bilateral superior hilar retraction and coarse reticular changes in both lung apices. No focal mass, consolidation or macro nodule. No pneumothorax or effusion. Intact osseous structures. _______________     IMPRESSION: 1. Pulmonary hyperinflation with bilateral superior hilar retraction and reticular scarring. No acute process. Discharge Medications:     Current Discharge Medication List      START taking these medications    Details   nicotine (NICODERM CQ) 14 mg/24 hr patch 1 Patch by TransDERmal route daily for 30 days. Qty: 30 Patch, Refills: 0      enoxaparin (LOVENOX) 40 mg/0.4 mL 0.4 mL by SubCUTAneous route daily. Qty: 14 Syringe, Refills: 0      oxyCODONE-acetaminophen (PERCOCET) 5-325 mg per tablet Take 1-2 Tabs by mouth every four (4) hours as needed. Max Daily Amount: 12 Tabs.   Qty: 60 Tab, Refills: 0    Associated Diagnoses: Closed fracture of neck of right femur, initial encounter ([address]             Activity: PT/OT Eval and Treat    Diet: Regular Diet    Wound Care: As directed    Follow-up:   Please follow up with your PCP within 7 days to discuss your recent hospitalization. Patient to arrange.   Orthopedic surgery in 1 month  Staples out in 10-14 days         Total time spent including time spent on final examination and discharge discussion, discharge documentation and records reviewed and medication reconciliation: > 30 minutes    Evelina Epstein DO  Internal Medicine, Hospitalist  Pager: [phone]

## 2018-01-11 NOTE — PROGRESS NOTES
About 10 minutes ago I was notified by Unit Rowan the pt's 1500 transport had not arrived. I called [facility] and they said they were about 20 minutes out. I notified  and pt. Addy Brandon.  [facility]  [phone], beeper [phone]

## 2018-01-11 NOTE — ROUTINE PROCESS
Bedside and Verbal shift change report given to Casie IVEY (oncoming nurse) by Johnell Homans   (offgoing nurse). Report included the following information SBAR, Kardex, Intake/Output and MAR.

## 2018-01-11 NOTE — PROGRESS NOTES
I have reviewed discharge instructions with the patient. The patient verbalized understanding. Signature pad does not record signatures.

## 2018-01-11 NOTE — PROGRESS NOTES
Jan 11, 2018 1234 am- [facility] to Pick patient up at 300 pm. Dayana Chinchilla RN    Patient has been accepted to [facility]. I have placed him on will call with [facility] transport in anticipation of discharge. PCS form and check list to nurses station. Patient agrees with discharge plan and travel arrangements.  Dayana Chinchilla RN

## 2018-01-11 NOTE — PROGRESS NOTES
Problem: Mobility Impaired (Adult and Pediatric)  Goal: *Acute Goals and Plan of Care (Insert Text)  Physical Therapy Goals  Initiated 1/9/2018 and to be accomplished within 7 day(s)  1. Patient will move from supine to sit and sit to supine , scoot up and down and roll side to side in bed with modified independence. 2.  Patient will transfer from bed to chair and chair to bed with modified independence using the least restrictive device. 3.  Patient will perform sit to stand with modified independence. 4.  Patient will ambulate with modified independence for 300 feet with the least restrictive device. 5.  Patient will ascend/descend 4 stairs with  handrail(s) with modified independence to egress home . Outcome: Progressing Towards Goal  physical Therapy TREATMENT    Patient: Gerald Austin (89 y.o. male)  Date: 1/11/2018  Diagnosis: Hip fracture, right ([address]  Hip fracture, right ([address]  right hip fracture Hip fracture, right ([facility])  Procedure(s) (LRB):  RIGHT HIP BIPOLAR HEMIARTHROPLASTY (Right) 3 Days Post-Op  Precautions: Fall, WBAT   Chart, physical therapy assessment, plan of care and goals were reviewed. PLOF: Independent, no AD needed for ambulation  ASSESSMENT:  A lot of time in required for pt to perform bed mobility with many pauses after every inch of movement. Increased wt shift to the L off of R hip and pt keeps RLE thigh muscles in constant contraction requiring increased VC to relax. ModA for sit to stand. Ambulated 24ft with RW, very short steps (B)LE, decreased step clearance R>L, no LOB or path deviations, very slow pace. Pt reports sacral pain, no mepilex on and also requesting a bath. Gown and bed pad changed due to condom cath leaking. Assisted back to supine, RLE supported into neutral with pillow. Notified nurse and CNA about SPA bath and sacral mepilex.     Education: relax RLE muscles, gt sequence, RW mgmt  Progression toward goals:  []      Improving appropriately and progressing toward goals  [x]      Improving slowly and progressing toward goals  []      Not making progress toward goals and plan of care will be adjusted     PLAN:  Patient continues to benefit from skilled intervention to address the above impairments. Continue treatment per established plan of care. Discharge Recommendations:  Skilled Nursing Facility  Further Equipment Recommendations for Discharge:  rolling walker     SUBJECTIVE:   Patient stated I have been asking for a bath every day and have not gotten one yet.     OBJECTIVE DATA SUMMARY:   Critical Behavior:  Neurologic State: Alert  Orientation Level: Oriented X4  Cognition: Appropriate decision making  Safety/Judgement: Awareness of environment, Fall prevention  Functional Mobility Training:  Bed Mobility:  Supine to Sit: Additional time;Minimum assistance; Moderate assistance  Sit to Supine: Additional time; Moderate assistance  Scooting: Additional time;Minimum assistance  Transfers:  Sit to Stand: Additional time;Minimum assistance; Moderate assistance  Stand to Sit: Minimum assistance; Additional time  Balance:  Sitting: Impaired; With support  Sitting - Static: Fair (occasional)  Sitting - Dynamic: Fair (occasional) (-)  Standing: Impaired; With support  Standing - Static: Fair  Standing - Dynamic : Fair  Ambulation/Gait Training:  Distance (ft): 24 Feet (ft)  Assistive Device: Gait belt;Walker, rolling  Ambulation - Level of Assistance: Contact guard assistance; Additional time  Gait Abnormalities: Antalgic;Decreased step clearance;Shuffling gait; Step to gait  Base of Support: Shift to left  Stance: Left increased  Speed/Latesha: Delayed;Slow;Shuffled  Step Length: Left shortened;Right shortened  Swing Pattern: Right asymmetrical  Pain:  Pre:0  Post:8  Pain Scale 1: Numeric (0 - 10)    Activity Tolerance:   Poor  Please refer to the flowsheet for vital signs taken during this treatment.   After treatment:   [] Patient left in no apparent distress sitting up in chair  [x] Patient left in no apparent distress in bed  [x] Call bell left within reach  [x] Nursing notified  [] Caregiver present  [] Bed alarm activated      Priyanka Alejandre PTA   Time Calculation: 38 mins

## 2018-01-11 NOTE — PROGRESS NOTES
Problem: Falls - Risk of  Goal: *Absence of Falls  Document Rick Fall Risk and appropriate interventions in the flowsheet.    Outcome: Progressing Towards Goal  Fall Risk Interventions:  Mobility Interventions: Patient to call before getting OOB         Medication Interventions: Patient to call before getting OOB    Elimination Interventions: Call light in reach    History of Falls Interventions: Consult care management for discharge planning, Door open when patient unattended, Evaluate medications/consider consulting pharmacy, Room close to nurse's station

## 2018-02-15 ENCOUNTER — HOSPITAL ENCOUNTER (EMERGENCY)
Age: 78
Discharge: HOME | End: 2018-02-15
Attending: EMERGENCY MEDICINE
Payer: COMMERCIAL

## 2018-02-15 VITALS
TEMPERATURE: 98.2 F | RESPIRATION RATE: 16 BRPM | OXYGEN SATURATION: 99 % | DIASTOLIC BLOOD PRESSURE: 88 MMHG | SYSTOLIC BLOOD PRESSURE: 142 MMHG | HEART RATE: 88 BPM

## 2018-02-15 DIAGNOSIS — M25.571 CHRONIC PAIN OF RIGHT ANKLE: Primary | ICD-10-CM

## 2018-02-15 DIAGNOSIS — M25.471 ANKLE SWELLING, RIGHT: ICD-10-CM

## 2018-02-15 DIAGNOSIS — G89.29 CHRONIC PAIN OF RIGHT ANKLE: Primary | ICD-10-CM

## 2018-02-15 PROCEDURE — 99282 EMERGENCY DEPT VISIT SF MDM: CPT

## 2018-02-15 PROCEDURE — 93971 EXTREMITY STUDY: CPT

## 2018-02-15 RX ORDER — HYDROMORPHONE HYDROCHLORIDE 2 MG/1
TABLET ORAL
COMMUNITY
End: 2022-10-26

## 2018-02-15 NOTE — PROCEDURES
[facility]  *** FINAL REPORT ***    Name: Beryle Akin  MRN: [id number]    Outpatient  : 24 Aug 1940  HIS Order #: [id number]  [id number] [facility] Visit #: [id number]  Date: 15 Feb 2018    TYPE OF TEST: Peripheral Venous Testing    REASON FOR TEST  Limb swelling    Right Leg:-  Deep venous thrombosis:           No  Superficial venous thrombosis:    No  Deep venous insufficiency:        No  Superficial venous insufficiency: No      INTERPRETATION/FINDINGS  Duplex images were obtained using 2-D gray scale, color flow, and  spectral Doppler analysis. Right leg :  1. Deep vein(s) visualized include the common femoral, deep femoral,  proximal femoral, mid femoral, distal femoral, popliteal(above knee),  popliteal(fossa), popliteal(below knee), gastrocnemius, posterior  tibial and peroneal veins. 2. No evidence of deep venous thrombosis detected in the veins  visualized. 3. No evidence of deep vein thrombosis in the contralateral common  femoral vein. 4. Superficial vein(s) visualized include the great saphenous and  small saphenous veins. 5. No evidence of superficial thrombosis detected. 6. No evidence of reflux detected in the deep veins visualized. 7. No evidence of reflux detected in the superficial veins visualized. ADDITIONAL COMMENTS  Wet reading given to [facility]    I have personally reviewed the data relevant to the interpretation of  this  study. TECHNOLOGIST: Per Stoll.  Jennifer Garcia  Signed: 02/15/2018 03:46 PM    PHYSICIAN: Johnson Powers MD  Signed: 02/15/2018 05:10 PM

## 2018-02-15 NOTE — ED TRIAGE NOTES
Had hip surgery in January. On xeralto bid. intermitternt pain and burning feeling. Has DVT in right leg. Concerned from [facility] [address] may have another.

## 2018-02-15 NOTE — ED PROVIDER NOTES
HPI Comments: Chen Marte is a 65 y.o. Male with a history of DVT, who presents today for concerns of \"blood clot\". He states he had hip surgery in  of this year and was sent to rehab and while in rehab developed a blood clot in his right ankle. He states he has intermittent pain and swelling in that right ankle still. He is on blood thinners and takes them appropritalty. He is also in physcial therapy and states \"I am really coming along\". He was reading a book and the man in the book  of a clot to his brain and pt states \"It really got me concerned\". Denies PCP. Has tried compression stalking with moderate help, and nothing makes it better. Pain is a 2/10 when it is present. Denies shortness of breath, Chest pain, dizziness, fever. Patient is a 65 y.o. male presenting with leg pain. The history is provided by the patient. Leg Pain    Pertinent negatives include no back pain. History reviewed. No pertinent past medical history. History reviewed. No pertinent surgical history. History reviewed. No pertinent family history. Social History     Social History    Marital status: SINGLE     Spouse name: N/A    Number of children: N/A    Years of education: N/A     Occupational History    Not on file. Social History Main Topics    Smoking status: Current Every Day Smoker     Years: 0.50     Types: Cigarettes    Smokeless tobacco: Never Used    Alcohol use Yes      Comment: occ    Drug use: No    Sexual activity: Not on file     Other Topics Concern    Not on file     Social History Narrative         ALLERGIES: Pcn [penicillins] and Sulfa (sulfonamide antibiotics)    Review of Systems   Constitutional: Negative for chills and fever. HENT: Negative for congestion, rhinorrhea and sore throat. Respiratory: Negative for cough and shortness of breath. Cardiovascular: Negative for chest pain.    Gastrointestinal: Negative for abdominal pain, blood in stool, constipation, diarrhea, nausea and vomiting. Genitourinary: Negative for dysuria, frequency and hematuria. Musculoskeletal: Negative for back pain and myalgias. Right ankle pain and swelling    Skin: Negative for rash and wound. Neurological: Negative for dizziness and headaches. All other systems reviewed and are negative. There were no vitals filed for this visit. Physical Exam   Constitutional: He is oriented to person, place, and time. He appears well-developed and well-nourished. No distress. HENT:   Head: Normocephalic and atraumatic. Eyes: Conjunctivae are normal.   Neck: Normal range of motion. Neck supple. Cardiovascular: Normal rate, regular rhythm and normal heart sounds. Pulmonary/Chest: Effort normal and breath sounds normal. No respiratory distress. He exhibits no tenderness. Abdominal: Soft. Bowel sounds are normal. He exhibits no distension. There is no tenderness. There is no rebound and no guarding. Musculoskeletal: Normal range of motion. He exhibits no edema or deformity. Right ankle: He exhibits swelling. He exhibits normal range of motion, no ecchymosis, no deformity, no laceration and normal pulse. No tenderness. Achilles tendon normal. Achilles tendon exhibits no pain, no defect and normal Izaguirre's test results. Neurological: He is alert and oriented to person, place, and time. Skin: Skin is warm and dry. He is not diaphoretic. Psychiatric: He has a normal mood and affect. His behavior is normal.   Nursing note and vitals reviewed. MDM  Number of Diagnoses or Management Options  Ankle swelling, right:   Chronic pain of right ankle:   Diagnosis management comments: Differential: DVT, fracture, dislocation, cellulites, CHF     Plan: Will order PVL to determine if outpatient therapy has been effective. 4:27 PM  I have shared the reassuring study results with the patient patient is pleased with this information.  I have suggested he follow up with PCP in 2-3 days for follow up. Patient agrees with the plan and management and states all questions have been thoroughly answered and there are no more remaining questions. I have advised patient to return if concerns or condition worsen. Patient is stable for discharge. Amount and/or Complexity of Data Reviewed  Clinical lab tests: ordered and reviewed  Independent visualization of images, tracings, or specimens: yes    Risk of Complications, Morbidity, and/or Mortality  Presenting problems: moderate  Diagnostic procedures: moderate  Management options: moderate          ED Course       Procedures      Provider Attestation:    I was personally available for consultation in the emergency department. I have reviewed the chart prior to the patient being discharged and agree with the [facility], including the assessment, treatment plan, and disposition.      Mary Ann Hernandez, DO

## 2018-02-15 NOTE — DISCHARGE INSTRUCTIONS
Deep Vein Thrombosis: Care Instructions  Your Care Instructions    A deep vein thrombosis (DVT) is a blood clot in certain veins of the legs, pelvis, or arms. Blood clots in these veins need to be treated because they can get bigger, break loose, and travel through the bloodstream to the lungs. A blood clot in a lung can be life-threatening. The doctor may have given you a blood thinner (anticoagulant). A blood thinner can stop the blood clot from growing larger and prevent new clots from forming. You will need to take a blood thinner for 3 to 6 months or longer. The doctor has checked you carefully, but problems can develop later. If you notice any problems or new symptoms, get medical treatment right away. Follow-up care is a key part of your treatment and safety. Be sure to make and go to all appointments, and call your doctor if you are having problems. It's also a good idea to know your test results and keep a list of the medicines you take. How can you care for yourself at home? · Take your medicines exactly as prescribed. Call your doctor if you think you are having a problem with your medicine. · If you are taking a blood thinner, be sure you get instructions about how to take your medicine safely. Blood thinners can cause serious bleeding problems. · Wear compression stockings if your doctor recommends them. These stockings are tighter at the feet than on the legs. They may reduce pain and swelling in your legs. But there are different types of stockings, and they need to fit right. So your doctor will recommend what you need. · When you sit, use a pillow to raise the arm or leg that has the blood clot. Try to keep it above the level of your heart. When should you call for help? Call 911 anytime you think you may need emergency care. For example, call if:  ? · You passed out (lost consciousness). ? · You have symptoms of a blood clot in your lung (called a pulmonary embolism).  These include:  ¨ Sudden chest pain. ¨ Trouble breathing. ¨ Coughing up blood. ?Call your doctor now or seek immediate medical care if:  ? · You have new or worse trouble breathing. ? · You are dizzy or lightheaded, or you feel like you may faint. ? · You have symptoms of a blood clot in your arm or leg. These may include:  ¨ Pain in the arm, calf, back of the knee, thigh, or groin. ¨ Redness and swelling in the arm, leg, or groin. ? Watch closely for changes in your health, and be sure to contact your doctor if:  ? · You do not get better as expected. Where can you learn more? Go to http://chacha-ludmila.info/. Enter L703 in the search box to learn more about \"Deep Vein Thrombosis: Care Instructions. \"  Current as of: March 20, 2017  Content Version: 11.4  © 0653-3836 [employer]. Care instructions adapted under license by [employer] (which disclaims liability or warranty for this information). If you have questions about a medical condition or this instruction, always ask your healthcare professional. [place] [address] any warranty or liability for your use of this information.

## 2018-02-16 ENCOUNTER — OFFICE VISIT (OUTPATIENT)
Dept: FAMILY MEDICINE CLINIC | Age: 78
End: 2018-02-16

## 2018-02-16 VITALS
HEIGHT: 70 IN | BODY MASS INDEX: 19.47 KG/M2 | SYSTOLIC BLOOD PRESSURE: 125 MMHG | RESPIRATION RATE: 16 BRPM | OXYGEN SATURATION: 97 % | DIASTOLIC BLOOD PRESSURE: 63 MMHG | WEIGHT: 136 LBS | HEART RATE: 82 BPM | TEMPERATURE: 97 F

## 2018-02-16 DIAGNOSIS — Z98.890 HISTORY OF HIP SURGERY: ICD-10-CM

## 2018-02-16 DIAGNOSIS — M25.471 RIGHT ANKLE SWELLING: Primary | ICD-10-CM

## 2018-02-16 NOTE — PROGRESS NOTES
rm :1    Chief Complaint   Patient presents with   [facility] Follow Up     pt slipped on black ice and broke his right hip     Flu Shot Requested: no    Depression Screening:  PHQ over the last two weeks 2/16/2018   Little interest or pleasure in doing things Not at all   Feeling down, depressed or hopeless Not at all   Total Score PHQ 2 0       Learning Assessment:  Learning Assessment 2/16/2018   PRIMARY LEARNER Patient   HIGHEST LEVEL OF EDUCATION - PRIMARY LEARNER  GRADUATED HIGH SCHOOL OR GED   PRIMARY LANGUAGE ENGLISH   LEARNER PREFERENCE PRIMARY DEMONSTRATION   ANSWERED BY patient   RELATIONSHIP SELF       Abuse Screening:  No flowsheet data found. Health Maintenance reviewed and discussed per provider: yes     Coordination of Care:    1. Have you been to the ER, urgent care clinic since your last visit? Hospitalized since your last visit? no    2. Have you seen or consulted any other health care providers outside of the [address] [place] [address] since your last visit? Include any pap smears or colon screening.  no

## 2018-02-16 NOTE — PATIENT INSTRUCTIONS
You should get back into the compression stockings. Continue xarelto until the orthopedist says you don't need it anymore. Recheck as needed. You need to stop smoking, your hip will heal faster.

## 2018-02-16 NOTE — PROGRESS NOTES
HISTORY OF PRESENT ILLNESS  Chalo Wallace is a 65 y.o. male. HPI Comments: Mr. Huyen Marc presents for an ER follow up, he was seen at [address] [place] [address] for R ankle swelling, s/p R hip surgery on 1/8/18 after a fall on 1/7/18. During his hospital stay DVT was ruled out. He has been experiencing intermittent R ankle swelling, which improves with elevation, he was given compression stockings, which he has not been using. He has noted much improvement overall after his surgery and continues to do PT twice a week at home. He has been a smoker for 50 years and smokes a pack a day. Currently patient is taking Dilauded and Xarelto. He has an appointment to follow up with ortho on 3/13/18. Hospital Follow Up   Pertinent negatives include no chest pain, no abdominal pain, no headaches and no shortness of breath. Review of Systems   Constitutional: Negative for chills and fever. Eyes: Negative for blurred vision and double vision. Respiratory: Negative for cough and shortness of breath. Cardiovascular: Negative for chest pain and palpitations. Gastrointestinal: Negative for abdominal pain, nausea and vomiting. Musculoskeletal: Positive for joint pain (hip). Ankle swelling   Neurological: Negative for headaches. Physical Exam   Constitutional: He is oriented to person, place, and time. He appears well-developed and well-nourished. Eyes: Pupils are equal, round, and reactive to light. Neck: Neck supple. No thyromegaly present. Cardiovascular: Normal rate, regular rhythm and normal heart sounds. Pulmonary/Chest: Effort normal and breath sounds normal. No respiratory distress. He has no wheezes. Abdominal: Soft. Musculoskeletal:   Mild swelling R ankle, nontender. Calves non-tender   Lymphadenopathy:     He has no cervical adenopathy. Neurological: He is alert and oriented to person, place, and time. Nursing note and vitals reviewed.     ED report from [facility] reviewed, no DVT  ASSESSMENT and PLAN ICD-10-CM ICD-9-CM    1. Right ankle swelling M25.471 719.07    2. History of hip surgery Z98.890 V45.89        See orders. Pt needs to quit smoking.  Follow up with PCP

## 2018-02-16 NOTE — MR AVS SNAPSHOT
[address] [place] [id number] 
[phone] Patient: Peter Bee MRN: [id number] [facility]:[birth year] Visit Information Date & Time Provider Department Dept. Phone Encounter #  
 2/16/2018  2:30 PM Zulma Kingsley, [address] [phone] [id number] Follow-up Instructions Return if symptoms worsen or fail to improve. Upcoming Health Maintenance Date Due DTaP/Tdap/Td series (1 - Tdap) 8/24/1960 ZOSTER VACCINE AGE 60> 6/24/1999 GLAUCOMA SCREENING Q2Y 8/24/2004 Pneumococcal 65+ Low/Medium Risk (1 of 2 - PCV13) 8/24/2004 MEDICARE YEARLY EXAM 8/24/2004 Influenza Age 5 to Adult 8/1/2017 Allergies as of 2/16/2018  Review Complete On: 2/16/2018 By: Mai Quarles LPN Severity Noted Reaction Type Reactions Pcn [Penicillins]  07/28/2010    Rash  
 Sulfa (Sulfonamide Antibiotics)  07/28/2010    Rash Current Immunizations  Never Reviewed No immunizations on file. Not reviewed this visit You Were Diagnosed With   
  
 Codes Comments Right ankle swelling    -  Primary ICD-10-CM: M25.471 ICD-9-CM: 719.07 History of hip surgery     ICD-10-CM: Z98.890 ICD-9-CM: V45.89 Vitals BP Pulse Temp Resp Height(growth percentile) Weight(growth percentile) 125/63 (BP 1 Location: Right arm, BP Patient Position: Sitting) 82 97 °F (36.1 °C) (Oral) 16 5' 10\" (1.778 m) 136 lb (61.7 kg) SpO2 BMI Smoking Status 97% 19.51 kg/m2 Current Every Day Smoker Vitals History BMI and BSA Data Body Mass Index Body Surface Area  
 19.51 kg/m 2 1.75 m 2 Preferred Pharmacy Pharmacy Name Phone [id number] [address], [address] [place] [phone] Your Updated Medication List  
  
   
This list is accurate as of: 2/16/18  3:23 PM.  Always use your most recent med list.  
  
  
  
  
 DILAUDID 2 mg tablet Generic drug:  HYDROmorphone Take  by mouth every four (4) hours as needed for Pain. XARELTO 15 mg Tab tablet Generic drug:  rivaroxaban Take  by mouth daily. Follow-up Instructions Return if symptoms worsen or fail to improve. Patient Instructions You should get back into the compression stockings. Continue xarelto until the orthopedist says you don't need it anymore. Recheck as needed. You need to stop smoking, your hip will heal faster. Introducing [facility]! Ansley Mcintosh introduces [employer] patient portal. Now you can access parts of your medical record, email your doctor's office, and request medication refills online. 1. In your internet browser, go to [email or web address] 2. Click on the First Time User? Click Here link in the Sign In box. You will see the New Member Sign Up page. 3. Enter your [employer] Access Code exactly as it appears below. You will not need to use this code after youve completed the sign-up process. If you do not sign up before the expiration date, you must request a new code. · [employer] Access Code: [email or web address] Expires: 4/8/2018  4:10 PM 
 
4. Enter the last four digits of your Social Security Number (xxxx) and Date of Birth (mm/dd/yyyy) as indicated and click Submit. You will be taken to the next sign-up page. 5. Create a [employer] ID. This will be your [employer] login ID and cannot be changed, so think of one that is secure and easy to remember. 6. Create a [employer] password. You can change your password at any time. 7. Enter your Password Reset Question and Answer. This can be used at a later time if you forget your password. 8. Enter your e-mail address. You will receive e-mail notification when new information is available in [address] 9. Click Sign Up. You can now view and download portions of your medical record.  
10. Click the Download Summary menu link to download a portable copy of your medical information. If you have questions, please visit the Frequently Asked Questions section of the [employer] website. Remember, [employer] is NOT to be used for urgent needs. For medical emergencies, dial 911. Now available from your iPhone and Android! Please provide this summary of care documentation to your next provider. Your primary care clinician is listed as Maxine Mcgowan. If you have any questions after today's visit, please call [phone].

## 2022-03-19 PROBLEM — S72.001A HIP FRACTURE, RIGHT (HCC): Status: ACTIVE | Noted: 2018-01-07

## 2022-03-19 PROBLEM — F17.200 CURRENT SMOKER: Status: ACTIVE | Noted: 2018-01-07

## 2023-11-06 ENCOUNTER — OFFICE VISIT (OUTPATIENT)
Facility: CLINIC | Age: 83
End: 2023-11-06

## 2023-11-06 ENCOUNTER — HOSPITAL ENCOUNTER (OUTPATIENT)
Facility: HOSPITAL | Age: 83
Setting detail: SPECIMEN
Discharge: HOME | End: 2023-11-09

## 2023-11-06 VITALS
DIASTOLIC BLOOD PRESSURE: 68 MMHG | BODY MASS INDEX: 20.25 KG/M2 | TEMPERATURE: 97.9 F | HEART RATE: 79 BPM | OXYGEN SATURATION: 96 % | WEIGHT: 126 LBS | HEIGHT: 66 IN | RESPIRATION RATE: 15 BRPM | SYSTOLIC BLOOD PRESSURE: 135 MMHG

## 2023-11-06 DIAGNOSIS — Z76.89 ENCOUNTER TO ESTABLISH CARE: ICD-10-CM

## 2023-11-06 DIAGNOSIS — N40.0 BENIGN PROSTATIC HYPERPLASIA WITHOUT LOWER URINARY TRACT SYMPTOMS: ICD-10-CM

## 2023-11-06 DIAGNOSIS — N32.3 BLADDER DIVERTICULUM: ICD-10-CM

## 2023-11-06 DIAGNOSIS — Z76.89 ENCOUNTER TO ESTABLISH CARE: Primary | ICD-10-CM

## 2023-11-06 DIAGNOSIS — F17.200 CURRENT SMOKER: ICD-10-CM

## 2023-11-06 DIAGNOSIS — Z23 NEEDS FLU SHOT: ICD-10-CM

## 2023-11-06 LAB
ALBUMIN SERPL-MCNC: 3.9 G/DL (ref 3.4–5)
ALBUMIN/GLOB SERPL: 1.1 (ref 0.8–1.7)
ALP SERPL-CCNC: 91 U/L (ref 45–117)
ALT SERPL-CCNC: 17 U/L (ref 16–61)
ANION GAP SERPL CALC-SCNC: 7 MMOL/L (ref 3–18)
AST SERPL-CCNC: 14 U/L (ref 10–38)
BILIRUB SERPL-MCNC: 0.7 MG/DL (ref 0.2–1)
BUN SERPL-MCNC: 17 MG/DL (ref 7–18)
BUN/CREAT SERPL: 16 (ref 12–20)
CALCIUM SERPL-MCNC: 9.6 MG/DL (ref 8.5–10.1)
CHLORIDE SERPL-SCNC: 102 MMOL/L (ref 100–111)
CHOLEST SERPL-MCNC: 168 MG/DL
CO2 SERPL-SCNC: 28 MMOL/L (ref 21–32)
CREAT SERPL-MCNC: 1.07 MG/DL (ref 0.6–1.3)
ERYTHROCYTE [DISTWIDTH] IN BLOOD BY AUTOMATED COUNT: 15.6 % (ref 11.6–14.5)
EST. AVERAGE GLUCOSE BLD GHB EST-MCNC: 126 MG/DL
GLOBULIN SER CALC-MCNC: 3.5 G/DL (ref 2–4)
GLUCOSE SERPL-MCNC: 181 MG/DL (ref 74–99)
HBA1C MFR BLD: 6 % (ref 4.2–5.6)
HCT VFR BLD AUTO: 44.4 % (ref 36–48)
HDLC SERPL-MCNC: 56 MG/DL (ref 40–60)
HDLC SERPL: 3 (ref 0–5)
HGB BLD-MCNC: 14.2 G/DL (ref 13–16)
LDLC SERPL CALC-MCNC: 92 MG/DL (ref 0–100)
LIPID PANEL: NORMAL
MCH RBC QN AUTO: 30.5 PG (ref 24–34)
MCHC RBC AUTO-ENTMCNC: 32 G/DL (ref 31–37)
MCV RBC AUTO: 95.3 FL (ref 78–100)
NRBC # BLD: 0 K/UL (ref 0–0.01)
NRBC BLD-RTO: 0 PER 100 WBC
PLATELET # BLD AUTO: 236 K/UL (ref 135–420)
PMV BLD AUTO: 10.7 FL (ref 9.2–11.8)
POTASSIUM SERPL-SCNC: 4.1 MMOL/L (ref 3.5–5.5)
PROT SERPL-MCNC: 7.4 G/DL (ref 6.4–8.2)
RBC # BLD AUTO: 4.66 M/UL (ref 4.35–5.65)
SODIUM SERPL-SCNC: 137 MMOL/L (ref 136–145)
TRIGL SERPL-MCNC: 100 MG/DL
VLDLC SERPL CALC-MCNC: 20 MG/DL
WBC # BLD AUTO: 6.4 K/UL (ref 4.6–13.2)

## 2023-11-06 PROCEDURE — 80061 LIPID PANEL: CPT

## 2023-11-06 PROCEDURE — 85027 COMPLETE CBC AUTOMATED: CPT

## 2023-11-06 PROCEDURE — 36415 COLL VENOUS BLD VENIPUNCTURE: CPT

## 2023-11-06 PROCEDURE — 83036 HEMOGLOBIN GLYCOSYLATED A1C: CPT

## 2023-11-06 PROCEDURE — 80053 COMPREHEN METABOLIC PANEL: CPT

## 2023-11-06 SDOH — ECONOMIC STABILITY: FOOD INSECURITY: WITHIN THE PAST 12 MONTHS, YOU WORRIED THAT YOUR FOOD WOULD RUN OUT BEFORE YOU GOT MONEY TO BUY MORE.: NEVER TRUE

## 2023-11-06 SDOH — ECONOMIC STABILITY: HOUSING INSECURITY
IN THE LAST 12 MONTHS, WAS THERE A TIME WHEN YOU DID NOT HAVE A STEADY PLACE TO SLEEP OR SLEPT IN A SHELTER (INCLUDING NOW)?: NO

## 2023-11-06 SDOH — ECONOMIC STABILITY: FOOD INSECURITY: WITHIN THE PAST 12 MONTHS, THE FOOD YOU BOUGHT JUST DIDN'T LAST AND YOU DIDN'T HAVE MONEY TO GET MORE.: NEVER TRUE

## 2023-11-06 SDOH — ECONOMIC STABILITY: INCOME INSECURITY: HOW HARD IS IT FOR YOU TO PAY FOR THE VERY BASICS LIKE FOOD, HOUSING, MEDICAL CARE, AND HEATING?: NOT HARD AT ALL

## 2023-11-06 ASSESSMENT — PATIENT HEALTH QUESTIONNAIRE - PHQ9
SUM OF ALL RESPONSES TO PHQ QUESTIONS 1-9: 0
SUM OF ALL RESPONSES TO PHQ QUESTIONS 1-9: 0
1. LITTLE INTEREST OR PLEASURE IN DOING THINGS: 0
SUM OF ALL RESPONSES TO PHQ QUESTIONS 1-9: 0
SUM OF ALL RESPONSES TO PHQ QUESTIONS 1-9: 0
SUM OF ALL RESPONSES TO PHQ9 QUESTIONS 1 & 2: 0
2. FEELING DOWN, DEPRESSED OR HOPELESS: 0

## 2023-11-06 NOTE — PATIENT INSTRUCTIONS
We will need your previous vaccination records. Please ask your previous PCP for those records, so we can update your medical record.

## 2023-11-06 NOTE — PROGRESS NOTES
Gama Crowe is a 79y.o. year old male who presents today for   Chief Complaint   Patient presents with    Establish Care    Hypertension   Requesting flu shot today    Is someone accompanying this pt? Bennett Peñaloza, caregiver, longtime friend    Is the patient using any DME equipment during [address]? No    Depression Screenin/6/2023    11:02 AM   PHQ-9 Questionaire   Little interest or pleasure in doing things 0   Feeling down, depressed, or hopeless 0   PHQ-9 Total Score 0       Abuse Screenin/6/2023    11:00 AM   AMB Abuse Screening   Do you ever feel afraid of your partner? N   Are you in a relationship with someone who physically or mentally threatens you? N   Is it safe for you to go home? Y       Learning Assessment:  No question data found. Fall Risk:      2023    11:03 AM   Fall Risk   2 or more falls in past year? no   Fall with injury in past year? no           Coordination of Care:   1. \"Have you been to the ER, urgent care clinic since your last visit? Hospitalized since your last visit? \" NA    2. \"Have you seen or consulted any other health care providers outside of the [address] [place] [address] since your last visit? \" NA    3. For patients aged 43-73: Has the patient had a colonoscopy / FIT/ Cologuard? NA    If the patient is female:    4. For patients aged 43-66: Has the patient had a mammogram within the past 2 years? NA    5. For patients aged 21-65: Has the patient had a pap smear? NA    Health Maintenance: reviewed and discussed and ordered per Provider.     Health Maintenance Due   Topic Date Due    COVID-19 Vaccine (1) Never done    Pneumococcal 65+ years Vaccine (1 - PCV) Never done    Depression Screen  Never done    DTaP/Tdap/Td vaccine (1 - Tdap) Never done    Shingles vaccine (1 of 2) Never done    Flu vaccine (1) Never done        - Hardy Coreas LPN  [address] [facility] [place]  Phone: [phone]  Fax: [phone]
asx  - will monitor for anemia     Will discuss screenings and immunizations in next visit. On this date 11/6/2023 I have spent 48 minutes reviewing previous notes, test results and face to face with the patient discussing the diagnosis and importance of compliance with the treatment plan as well as documenting on the day of the visit. I have discussed the diagnosis with the patient and the intended plan as seen in the above orders. The patient has received an after-visit summary and questions were answered concerning future plans. I have discussed medication side effects and warnings with the patient as well. I have reviewed the plan of care with the patient, accepted their input and they are in agreement with the treatment goals. Follow-up and Dispositions    Return in 2 months (on 1/6/2024) for immunizations and screening dicussion. pt would like to arrange insurance prior to returning.          Jordy Ram MD  November 6, 2023

## 2024-01-10 ENCOUNTER — OFFICE VISIT (OUTPATIENT)
Facility: CLINIC | Age: 84
End: 2024-01-10
Payer: COMMERCIAL

## 2024-01-10 VITALS
HEART RATE: 78 BPM | OXYGEN SATURATION: 95 % | HEIGHT: 66 IN | DIASTOLIC BLOOD PRESSURE: 83 MMHG | BODY MASS INDEX: 19.44 KG/M2 | TEMPERATURE: 93.5 F | WEIGHT: 121 LBS | RESPIRATION RATE: 17 BRPM | SYSTOLIC BLOOD PRESSURE: 155 MMHG

## 2024-01-10 DIAGNOSIS — Z23 ENCOUNTER FOR IMMUNIZATION: Primary | ICD-10-CM

## 2024-01-10 DIAGNOSIS — R05.1 ACUTE COUGH: ICD-10-CM

## 2024-01-10 PROCEDURE — 1123F ACP DISCUSS/DSCN MKR DOCD: CPT | Performed by: STUDENT IN AN ORGANIZED HEALTH CARE EDUCATION/TRAINING PROGRAM

## 2024-01-10 PROCEDURE — 99214 OFFICE O/P EST MOD 30 MIN: CPT | Performed by: STUDENT IN AN ORGANIZED HEALTH CARE EDUCATION/TRAINING PROGRAM

## 2024-01-10 PROCEDURE — 90472 IMMUNIZATION ADMIN EACH ADD: CPT | Performed by: STUDENT IN AN ORGANIZED HEALTH CARE EDUCATION/TRAINING PROGRAM

## 2024-01-10 PROCEDURE — 90471 IMMUNIZATION ADMIN: CPT | Performed by: STUDENT IN AN ORGANIZED HEALTH CARE EDUCATION/TRAINING PROGRAM

## 2024-01-10 PROCEDURE — 90715 TDAP VACCINE 7 YRS/> IM: CPT | Performed by: STUDENT IN AN ORGANIZED HEALTH CARE EDUCATION/TRAINING PROGRAM

## 2024-01-10 PROCEDURE — 90677 PCV20 VACCINE IM: CPT | Performed by: STUDENT IN AN ORGANIZED HEALTH CARE EDUCATION/TRAINING PROGRAM

## 2024-01-10 NOTE — PROGRESS NOTES
Joe Norton (:  1940) is a 83 y.o. male here for evaluation of the following chief complaint(s):  No chief complaint on file.    Accompanied by  to assist with history (pt has poor hearing)    ASSESSMENT/PLAN:  1. Encounter for immunization  -     Pneumococcal, PCV20, PREVNAR 20, (age 6w+), IM, PF  -     Tdap, BOOSTRIX, (age 10 yrs+), IM  -     RSV, ABRYSVO, (age 60y+), PF, IM, 0.5mL  2. Acute cough  -     Dextromethorphan-guaiFENesin  MG CAPS; Take 1 capsule by mouth every 4 hours as needed (cough or chest congestion), Disp-60 capsule, R-1Normal  - rec'd smoking cessation  - no signs of infection    Follow-up and Dispositions    Return for please schedule for AWV.         SUBJECTIVE/OBJECTIVE:  HPI  Immunizations  - needs RSV, PCV20, boostrix  - get shingles vacc at pharm    Cough  - dry, for a few days  - improves after shower  - still smoking    ROS as stated above.    BP (!) 155/83 (Site: Right Upper Arm, Position: Sitting, Cuff Size: Small Adult)   Pulse 78   Temp (!) 93.5 °F (34.2 °C)   Resp 17   Ht 1.676 m (5' 6\")   Wt 54.9 kg (121 lb)   SpO2 95%   BMI 19.53 kg/m²     Physical Exam  Vitals and nursing note reviewed.   Constitutional:       Appearance: He is not ill-appearing.   Cardiovascular:      Rate and Rhythm: Normal rate and regular rhythm.      Pulses: Normal pulses.      Heart sounds: Normal heart sounds.   Pulmonary:      Effort: Pulmonary effort is normal.      Breath sounds: Normal breath sounds.   Neurological:      Mental Status: He is alert and oriented to person, place, and time. Mental status is at baseline.   Psychiatric:         Mood and Affect: Mood normal.         On this date 1/10/2024 I have spent 32 minutes reviewing previous notes, test results and face to face with the patient discussing the diagnosis and importance of compliance with the treatment plan as well as documenting on the day of the visit.      An electronic signature was used to

## 2024-01-10 NOTE — PROGRESS NOTES
Joe Norton is a 83 y.o. year old male who presents today for No chief complaint on file.      Is someone accompanying this pt? yes    Is the patient using any DME equipment during OV? no    Depression Screenin/6/2023    11:02 AM   PHQ-9 Questionaire   Little interest or pleasure in doing things 0   Feeling down, depressed, or hopeless 0   PHQ-9 Total Score 0       Abuse Screenin/6/2023    11:00 AM   AMB Abuse Screening   Do you ever feel afraid of your partner? N   Are you in a relationship with someone who physically or mentally threatens you? N   Is it safe for you to go home? Y       Learning Assessment:  No question data found.    Fall Risk:      2023    11:03 AM   Fall Risk   2 or more falls in past year? no   Fall with injury in past year? no           Coordination of Care:   1. \"Have you been to the ER, urgent care clinic since your last visit?  Hospitalized since your last visit?\" no    2. \"Have you seen or consulted any other health care providers outside of the [facility] since your last visit?\" no    3. For patients aged 45-75: Has the patient had a colonoscopy / FIT/ Cologuard? N/a    If the patient is female:    4. For patients aged 40-74: Has the patient had a mammogram within the past 2 years? N/a    5. For patients aged 21-65: Has the patient had a pap smear? N/a    Health Maintenance: reviewed and discussed and ordered per Provider.    Health Maintenance Due   Topic Date Due    COVID-19 Vaccine (1) Never done    Pneumococcal 65+ years Vaccine (1 - PCV) Never done    DTaP/Tdap/Td vaccine (1 - Tdap) Never done    Shingles vaccine (1 of 2) Never done    Respiratory Syncytial Virus (RSV) Pregnant or age 60 yrs+ (1 - 1-dose 60+ series) Never done    Annual Wellness Visit (Medicare)  Never done        - Luisa Anton CMA  [facility]  Phone: [phone]  Fax: [phone]

## 2024-01-10 NOTE — PATIENT INSTRUCTIONS
At your pharmacy, you need to get your Shingles vaccines and COVID boosters.    Today, you received three vaccines in the clinic: RSV, tetanus, and PCV20.

## 2024-02-13 ENCOUNTER — TELEPHONE (OUTPATIENT)
Facility: CLINIC | Age: 84
End: 2024-02-13

## 2024-02-13 NOTE — TELEPHONE ENCOUNTER
Patient is calling to get an update on the \" Verification of Condition\" form.    Provider discuss with patient 1/10/24, which need to be faxed to [facility] [place].    His insurance will be cancelled, if this is not done.    He is requesting for a follow up call, if the form is not in the clinic, he can stop by and bring another one.     Please advise    Thank you

## 2024-02-14 ENCOUNTER — TELEPHONE (OUTPATIENT)
Facility: CLINIC | Age: 84
End: 2024-02-14

## 2024-02-14 NOTE — TELEPHONE ENCOUNTER
----- Message from Lane CALDERON MD sent at 2/13/2024 12:49 PM EST -----  Regarding: AWV  Please call pt to schedule an AWV as soon as is convenient for them. He should also bring in his \"Verification of Condition\" form.

## 2024-02-14 NOTE — TELEPHONE ENCOUNTER
I CALLED THE PATIENT AND LEFT A MESSAGE STATING THAT HE HAS TO CALL THE PRACTICE AND MAKE AN APPOINTMENT FOR A MEDICARE WELLNESS CHECK UP.

## 2024-02-15 ENCOUNTER — OFFICE VISIT (OUTPATIENT)
Facility: CLINIC | Age: 84
End: 2024-02-15
Payer: COMMERCIAL

## 2024-02-15 VITALS
DIASTOLIC BLOOD PRESSURE: 75 MMHG | HEART RATE: 62 BPM | HEIGHT: 66 IN | BODY MASS INDEX: 19.58 KG/M2 | TEMPERATURE: 96.5 F | SYSTOLIC BLOOD PRESSURE: 135 MMHG | OXYGEN SATURATION: 97 % | WEIGHT: 121.8 LBS

## 2024-02-15 DIAGNOSIS — Z00.00 INITIAL MEDICARE ANNUAL WELLNESS VISIT: Primary | ICD-10-CM

## 2024-02-15 DIAGNOSIS — F17.200 CURRENT SMOKER: ICD-10-CM

## 2024-02-15 DIAGNOSIS — H91.92 HEARING LOSS OF LEFT EAR, UNSPECIFIED HEARING LOSS TYPE: ICD-10-CM

## 2024-02-15 PROCEDURE — 1123F ACP DISCUSS/DSCN MKR DOCD: CPT | Performed by: STUDENT IN AN ORGANIZED HEALTH CARE EDUCATION/TRAINING PROGRAM

## 2024-02-15 PROCEDURE — G0438 PPPS, INITIAL VISIT: HCPCS | Performed by: STUDENT IN AN ORGANIZED HEALTH CARE EDUCATION/TRAINING PROGRAM

## 2024-02-15 RX ORDER — BUPROPION HYDROCHLORIDE 150 MG/1
TABLET, FILM COATED, EXTENDED RELEASE ORAL
Qty: 180 TABLET | Refills: 1 | Status: SHIPPED | OUTPATIENT
Start: 2024-02-15

## 2024-02-15 NOTE — PATIENT INSTRUCTIONS
are often the cause of fatigue.  Fatigue is most often a symptom of another problem. Treatment for fatigue depends on the cause. For example, if you have fatigue because you have a certain health problem, treating this problem also treats your fatigue. If depression or anxiety is the cause, treatment may help.  Follow-up care is a key part of your treatment and safety. Be sure to make and go to all appointments, and call your doctor if you are having problems. It's also a good idea to know your test results and keep a list of the medicines you take.  How can you care for yourself at home?  Get regular exercise. But try not to overdo it. It may help to go back and forth between rest and exercise.  Get plenty of rest.  Eat a variety of healthy foods. Try not to skip any meals.  Avoid or try to cut back on your use of caffeine, tobacco, and alcohol. Caffeine is most often found in coffee, tea, cola drinks, and energy drinks.  Limit medicines that can cause fatigue. These include medicines such as cold and allergy medicines.  When should you call for help?  Watch closely for changes in your health, and be sure to contact your doctor if:    You have new symptoms such as fever or a rash.     Your fatigue gets worse.     You have been feeling down, depressed, or hopeless. Or you may have lost interest in things that you usually enjoy.     You are not getting better as expected.   Where can you learn more?  Go to https://www.[employer]/patientEd and enter W864 to learn more about \"Fatigue: Care Instructions.\"  Current as of: June 25, 2023               Content Version: 13.9  © 1555-4858 [employer].   Care instructions adapted under license by "[employer]. If you have questions about a medical condition or this instruction, always ask your healthcare professional. [employer] disclaims any warranty or liability for your use of this information.           Learning About Stress  What is

## 2024-02-15 NOTE — PROGRESS NOTES
Joe Norton is a 83 y.o. year old male who presents today for No chief complaint on file.      Is someone accompanying this pt? yes    Is the patient using any DME equipment during OV? no    Depression Screenin/15/2024     1:29 PM 2023    11:02 AM   PHQ-9 Questionaire   Little interest or pleasure in doing things 1 0   Feeling down, depressed, or hopeless 2 0   Trouble falling or staying asleep, or sleeping too much 3    Feeling tired or having little energy 3    Poor appetite or overeating 3    Feeling bad about yourself - or that you are a failure or have let yourself or your family down 0    Trouble concentrating on things, such as reading the newspaper or watching television 0    Moving or speaking so slowly that other people could have noticed. Or the opposite - being so fidgety or restless that you have been moving around a lot more than usual 1    Thoughts that you would be better off dead, or of hurting yourself in some way 0    PHQ-9 Total Score 13 0   If you checked off any problems, how difficult have these problems made it for you to do your work, take care of things at home, or get along with other people? 0        Abuse Screenin/6/2023    11:00 AM   AMB Abuse Screening   Do you ever feel afraid of your partner? N   Are you in a relationship with someone who physically or mentally threatens you? N   Is it safe for you to go home? Y       Learning Assessment:  No question data found.    Fall Risk:      2/15/2024     1:23 PM 2023    11:03 AM   Fall Risk   2 or more falls in past year?  no   Fall with injury in past year? no no           Coordination of Care:   1. \"Have you been to the ER, urgent care clinic since your last visit?  Hospitalized since your last visit?\" no    2. \"Have you seen or consulted any other health care providers outside of the [facility] since your last visit?\" no    3. For patients aged 45-75: Has the patient had a colonoscopy / FIT/

## 2024-02-15 NOTE — PROGRESS NOTES
Medicare Annual Wellness Visit    Joe Norton is here for No chief complaint on file.    Assessment & Plan   Initial Medicare annual wellness visit  Recommendations for Preventive Services Due: see orders and patient instructions/AVS.  Recommended screening schedule for the next 5-10 years is provided to the patient in written form: see Patient Instructions/AVS.     Return in 1 month (on 3/15/2024).     Subjective   The following acute and/or chronic problems were also addressed today:    Poor hearing  - chronic since childhood; thought to be related to an unspecified illness  - interested in audiology referral    Smoking  - trying to quit with patches, but they are disappointing  - interested in medication    Patient's complete Health Risk Assessment and screening values have been reviewed and are found in Flowsheets. The following problems were reviewed today and where indicated follow up appointments were made and/or referrals ordered.    Positive Risk Factor Screenings with Interventions:    Fall Risk:  Do you feel unsteady or are you worried about falling? : (!) yes (only hood he first wakes up)  Fall with injury in past year?: no     Interventions:    Patient comments: intermittent; due to not enough fluids  He plans to drink more water in the AM    Cognitive:   Clock Drawing Test (CDT): (!) Abnormal  Words recalled: 3 Words Recalled  Total Score: 3  Total Score Interpretation: Normal Mini-Cog  Interventions:  Patient comments: did not understand clock directions    Depression:  PHQ-2 Score: 3  PHQ-9 Total Score: 13    Interpretation:  5-9 mild   10-14 moderate   15-19 moderately severe   20-27 severe   Interventions:  Patient comments: unhappy with aging and everything that comes with it, but pt accepts it          General HRA Questions:  Select all that apply: (!) New or Increased Fatigue, Stress    Fatigue Interventions:  Patient comments: pt plans on eating more food    Stress Interventions:  Patient

## 2024-03-15 ENCOUNTER — OFFICE VISIT (OUTPATIENT)
Facility: CLINIC | Age: 84
End: 2024-03-15
Payer: COMMERCIAL

## 2024-03-15 VITALS
WEIGHT: 117.2 LBS | TEMPERATURE: 97.7 F | HEART RATE: 74 BPM | RESPIRATION RATE: 16 BRPM | SYSTOLIC BLOOD PRESSURE: 140 MMHG | HEIGHT: 66 IN | DIASTOLIC BLOOD PRESSURE: 79 MMHG | BODY MASS INDEX: 18.84 KG/M2 | OXYGEN SATURATION: 96 %

## 2024-03-15 DIAGNOSIS — G25.2 INTENTION TREMOR: Primary | ICD-10-CM

## 2024-03-15 DIAGNOSIS — F17.200 CURRENT SMOKER: ICD-10-CM

## 2024-03-15 PROCEDURE — 1123F ACP DISCUSS/DSCN MKR DOCD: CPT | Performed by: STUDENT IN AN ORGANIZED HEALTH CARE EDUCATION/TRAINING PROGRAM

## 2024-03-15 PROCEDURE — 99214 OFFICE O/P EST MOD 30 MIN: CPT | Performed by: STUDENT IN AN ORGANIZED HEALTH CARE EDUCATION/TRAINING PROGRAM

## 2024-03-15 RX ORDER — PROPRANOLOL HCL 60 MG
60 CAPSULE, EXTENDED RELEASE 24HR ORAL DAILY
Qty: 30 CAPSULE | Refills: 3 | Status: SHIPPED | OUTPATIENT
Start: 2024-03-15

## 2024-03-15 NOTE — PATIENT INSTRUCTIONS
[facility]  PHONE NUMBER: [phone]  Fax:[phone]    You should receive a call from the specialist in a few days. If you do not, please call this clinic for the specialist's phone number, so you can schedule yourself.     The propranolol is to help with your tremors.  If you become lightheaded stop taking it.

## 2024-03-15 NOTE — PROGRESS NOTES
Joe Norton (:  1940) is a 83 y.o. male here for evaluation of the following chief complaint(s):  Follow-up (I month f/u)        ASSESSMENT/PLAN:  1. Intention tremor  Assessment & Plan:  - possible essential tremor, cerebellar tremor, Parkinsons, psychogenic  - trial of propranolol  - neuro referral   Orders:  -     [facility] - Jatin Dela Cruz MD, Neurology, [place]  -     propranolol (INDERAL LA) 60 MG extended release capsule; Take 1 capsule by mouth daily, Disp-30 capsule, R-3Normal  2. Current smoker  Assessment & Plan:  - reduced smoking  - c/w Wellbutrin, been using x 1 month       Follow-up and Dispositions    Return in about 3 months (around 6/15/2024) for tremors.         SUBJECTIVE/OBJECTIVE:  HPI  Smoking cessation  - has cut back; taking wellbutrin 300 QD    Tremor  - started with R hand x 1 year, includes L hand x 1 week;  tremors have worsened  - exacerbated by intention  - has had trouble with ADLs like feeding himself  - denies falls, sudden change in speech, dysphagia, numbness    ROS as stated above.    BP (!) 140/79 (Site: Right Upper Arm, Position: Sitting, Cuff Size: Small Adult)   Pulse 74   Temp 97.7 °F (36.5 °C)   Resp 16   Ht 1.676 m (5' 6\")   Wt 53.2 kg (117 lb 3.2 oz)   SpO2 96%   BMI 18.92 kg/m²     Physical Exam  Neurological:      Mental Status: He is oriented to person, place, and time.      Cranial Nerves: No cranial nerve deficit.      Sensory: No sensory deficit.      Motor: No weakness.      Coordination: Coordination normal.      Gait: Gait normal.      Deep Tendon Reflexes: Reflexes abnormal (reduced in R patella, NL in L patella).      Comments: Finger to nose: increased tremor but manages to still touch finger and nose           On this date 3/15/2024 I have spent 31 minutes reviewing previous notes, test results and face to face with the patient discussing the diagnosis and importance of compliance with the treatment plan as well as documenting on the day of

## 2024-03-15 NOTE — ASSESSMENT & PLAN NOTE
- possible essential tremor, cerebellar tremor, Parkinsons, psychogenic  - trial of propranolol  - neuro referral

## 2024-03-15 NOTE — PROGRESS NOTES
Joe Norton is a 83 y.o. year old male who presents today for   Chief Complaint   Patient presents with    Follow-up     I month f/u       Is someone accompanying this pt? yes    Is the patient using any DME equipment during OV? no    Depression Screenin/15/2024     1:29 PM 2023    11:02 AM   PHQ-9 Questionaire   Little interest or pleasure in doing things 1 0   Feeling down, depressed, or hopeless 2 0   Trouble falling or staying asleep, or sleeping too much 3    Feeling tired or having little energy 3    Poor appetite or overeating 3    Feeling bad about yourself - or that you are a failure or have let yourself or your family down 0    Trouble concentrating on things, such as reading the newspaper or watching television 0    Moving or speaking so slowly that other people could have noticed. Or the opposite - being so fidgety or restless that you have been moving around a lot more than usual 1    Thoughts that you would be better off dead, or of hurting yourself in some way 0    PHQ-9 Total Score 13 0   If you checked off any problems, how difficult have these problems made it for you to do your work, take care of things at home, or get along with other people? 0        Abuse Screenin/6/2023    11:00 AM   AMB Abuse Screening   Do you ever feel afraid of your partner? N   Are you in a relationship with someone who physically or mentally threatens you? N   Is it safe for you to go home? Y       Learning Assessment:  No question data found.    Fall Risk:      2/15/2024     1:23 PM 2023    11:03 AM   Fall Risk   2 or more falls in past year?  no   Fall with injury in past year? no no           Coordination of Care:   1. \"Have you been to the ER, urgent care clinic since your last visit?  Hospitalized since your last visit?\" no    2. \"Have you seen or consulted any other health care providers outside of the [facility] since your last visit?\" no    3. For patients aged

## 2024-03-18 DIAGNOSIS — G25.2 INTENTION TREMOR: ICD-10-CM

## 2024-03-18 RX ORDER — PROPRANOLOL HCL 60 MG
60 CAPSULE, EXTENDED RELEASE 24HR ORAL DAILY
Qty: 30 CAPSULE | Refills: 3 | Status: SHIPPED | OUTPATIENT
Start: 2024-03-18

## 2024-03-18 NOTE — TELEPHONE ENCOUNTER
Medication requested :   Requested Prescriptions     Pending Prescriptions Disp Refills    propranolol (INDERAL LA) 60 MG extended release capsule 30 capsule 3     Sig: Take 1 capsule by mouth daily      PCP: Lane Leung MD  LOV:  03/15/2024  NOV DMA: 6/17/2024

## 2024-03-18 NOTE — TELEPHONE ENCOUNTER
[facility] called on the patient's behalf stating that he was in their office to  a medication: propranolol (INDERAL LA) 60 MG extended release capsule.  It was sent to their pharmacy and they do not prescribe that medication. Patient would like for his prescription to be sent to the [facility] pharmacy. Please advise, thank you.

## 2024-06-17 ENCOUNTER — OFFICE VISIT (OUTPATIENT)
Facility: CLINIC | Age: 84
End: 2024-06-17
Payer: COMMERCIAL

## 2024-06-17 VITALS
WEIGHT: 118.2 LBS | BODY MASS INDEX: 18.99 KG/M2 | HEART RATE: 60 BPM | OXYGEN SATURATION: 95 % | HEIGHT: 66 IN | DIASTOLIC BLOOD PRESSURE: 73 MMHG | SYSTOLIC BLOOD PRESSURE: 145 MMHG | RESPIRATION RATE: 12 BRPM | TEMPERATURE: 97.2 F

## 2024-06-17 DIAGNOSIS — R42 VERTIGO: ICD-10-CM

## 2024-06-17 DIAGNOSIS — G25.2 INTENTION TREMOR: Primary | ICD-10-CM

## 2024-06-17 PROCEDURE — 1123F ACP DISCUSS/DSCN MKR DOCD: CPT | Performed by: STUDENT IN AN ORGANIZED HEALTH CARE EDUCATION/TRAINING PROGRAM

## 2024-06-17 PROCEDURE — 99214 OFFICE O/P EST MOD 30 MIN: CPT | Performed by: STUDENT IN AN ORGANIZED HEALTH CARE EDUCATION/TRAINING PROGRAM

## 2024-06-17 PROCEDURE — G2211 COMPLEX E/M VISIT ADD ON: HCPCS | Performed by: STUDENT IN AN ORGANIZED HEALTH CARE EDUCATION/TRAINING PROGRAM

## 2024-06-17 NOTE — PROGRESS NOTES
Joe Norton (:  1940) is a 83 y.o. male here for evaluation of the following chief complaint(s):  Follow-up        ASSESSMENT/PLAN:  1. Intention tremor  Assessment & Plan:  - beta blocker not effective; possibl essential tremor, parkinsons  - neuro referral   2. Vertigo  Assessment & Plan:  - episode too brief to benefit from meds  - possible contribution by cerumen impaction; pt to try Debrox  - pt to discuss with neuro         Follow-up and Dispositions    Return in about 1 month (around 2024) for Hypertension.         SUBJECTIVE/OBJECTIVE:  HPI  Smoking cessation  - has cut back; taking wellbutrin 300 QD    Tremor  - started with R hand x 1 year, includes L hand x 1 week;  tremors have worsened  - exacerbated by intention  - has had trouble with ADLs like feeding himself  - denies falls, sudden change in speech, dysphagia, numbness  Update (24)  - no improvement despite beta blocker    Vertigo  - spinning sensation after getting out of bed; only occurs in the AM  - started last week and now daily; spinning lasts about 1 minute   - has residual fatigue for about an hour after  - similar sxs occurred 30 years ago    ROS as stated above.    BP (!) 145/73   Pulse 60   Temp 97.2 °F (36.2 °C) (Temporal)   Resp 12   Ht 1.676 m (5' 6\")   Wt 53.6 kg (118 lb 3.2 oz)   SpO2 95%   BMI 19.08 kg/m²     Physical Exam  HENT:      Right Ear: There is impacted cerumen.      Left Ear: Tympanic membrane, ear canal and external ear normal. There is no impacted cerumen.   Neurological:      Mental Status: He is oriented to person, place, and time.      Cranial Nerves: No cranial nerve deficit.      Sensory: No sensory deficit.      Motor: No weakness.      Coordination: Coordination normal.      Gait: Gait normal.      Deep Tendon Reflexes: Reflexes abnormal (reduced in R patella, NL in L patella).      Comments: Finger to nose: increased tremor but manages to still touch finger and nose           On

## 2024-06-17 NOTE — PATIENT INSTRUCTIONS
Referred To: [facility]  [address]  [address]  [place] [address]    Jatin Dela Cruz  [address]  [address]  [facility] [place] [facility] [address] Loc/POS:                Phone: [phone] Phone:     Fax: [phone]       Please try Debrox for your right ear. You can buy this in the pharmacy over the counter.    You can do a few things at home to improve your blood pressure.  Less salt in your diet.  Stop or reduce alcohol and cigarettes consumption.  Increase exercise or exercise more often.    We discussed blood pressure (BP) monitoring at home.  You should be seated for 10 minutes and completely relaxed before checking BP.  Measure BP once per day, at least 3 days pr week, and around the same time each day. Please keep a record of the readings and bring them into your next visit.  If BP is elevated (top number is 140 or greater or bottom number is 90 or greater), check the blood pressure again in 1 hour.  If you experiences chest pain, shortness of breath, severe headache, new vision changes, or new numbness/weakness associated with high blood pressure please go to the ER for evaluation.

## 2024-06-17 NOTE — PROGRESS NOTES
Joe Norton is a 83 y.o. year old male who presents today for   Chief Complaint   Patient presents with    Follow-up       Is someone accompanying this pt? No     Is the patient using any DME equipment during OV? No     Depression Screenin/15/2024     1:29 PM 2023    11:02 AM   PHQ-9 Questionaire   Little interest or pleasure in doing things 1 0   Feeling down, depressed, or hopeless 2 0   Trouble falling or staying asleep, or sleeping too much 3    Feeling tired or having little energy 3    Poor appetite or overeating 3    Feeling bad about yourself - or that you are a failure or have let yourself or your family down 0    Trouble concentrating on things, such as reading the newspaper or watching television 0    Moving or speaking so slowly that other people could have noticed. Or the opposite - being so fidgety or restless that you have been moving around a lot more than usual 1    Thoughts that you would be better off dead, or of hurting yourself in some way 0    PHQ-9 Total Score 13 0   If you checked off any problems, how difficult have these problems made it for you to do your work, take care of things at home, or get along with other people? 0        Abuse Screenin/6/2023    11:00 AM   AMB Abuse Screening   Do you ever feel afraid of your partner? N   Are you in a relationship with someone who physically or mentally threatens you? N   Is it safe for you to go home? Y       Learning Assessment:  No question data found.    Fall Risk:      2/15/2024     1:23 PM 2023    11:03 AM   Fall Risk   2 or more falls in past year?  no   Fall with injury in past year? no no           Coordination of Care:   1. \"Have you been to the ER, urgent care clinic since your last visit?  Hospitalized since your last visit?\" No     2. \"Have you seen or consulted any other health care providers outside of the [facility] since your last visit?\" Yes     3. For patients aged 45-75: Has the

## 2024-06-17 NOTE — ASSESSMENT & PLAN NOTE
- episode too brief to benefit from meds  - possible contribution by cerumen impaction; pt to try Debrox  - pt to discuss with neuro

## 2024-07-11 ENCOUNTER — TELEPHONE (OUTPATIENT)
Facility: CLINIC | Age: 84
End: 2024-07-11

## 2024-07-11 NOTE — TELEPHONE ENCOUNTER
Pt called in to have his neurology referral faxed over to [facility], the previous provider the pt was referred to is further for the pt to travel.     Fax # [phone]

## 2024-07-16 NOTE — TELEPHONE ENCOUNTER
Faxed referral and last 2 notes to [facility] neuro.     Patient referred to [facility].     Telephone: [phone]  Fax: [phone]

## 2024-07-25 ENCOUNTER — OFFICE VISIT (OUTPATIENT)
Facility: CLINIC | Age: 84
End: 2024-07-25
Payer: COMMERCIAL

## 2024-07-25 ENCOUNTER — HOSPITAL ENCOUNTER (OUTPATIENT)
Facility: HOSPITAL | Age: 84
Setting detail: SPECIMEN
Discharge: HOME | End: 2024-07-25
Payer: COMMERCIAL

## 2024-07-25 VITALS
HEIGHT: 66 IN | RESPIRATION RATE: 16 BRPM | OXYGEN SATURATION: 99 % | SYSTOLIC BLOOD PRESSURE: 169 MMHG | BODY MASS INDEX: 18.71 KG/M2 | TEMPERATURE: 97.3 F | WEIGHT: 116.4 LBS | HEART RATE: 58 BPM | DIASTOLIC BLOOD PRESSURE: 80 MMHG

## 2024-07-25 DIAGNOSIS — R73.03 PREDIABETES: ICD-10-CM

## 2024-07-25 DIAGNOSIS — I10 ESSENTIAL (PRIMARY) HYPERTENSION: Primary | ICD-10-CM

## 2024-07-25 DIAGNOSIS — I10 ESSENTIAL (PRIMARY) HYPERTENSION: ICD-10-CM

## 2024-07-25 DIAGNOSIS — R42 VERTIGO: ICD-10-CM

## 2024-07-25 LAB
ANION GAP SERPL CALC-SCNC: 3 MMOL/L (ref 3–18)
BUN SERPL-MCNC: 23 MG/DL (ref 7–18)
BUN/CREAT SERPL: 23 (ref 12–20)
CALCIUM SERPL-MCNC: 9.9 MG/DL (ref 8.5–10.1)
CHLORIDE SERPL-SCNC: 105 MMOL/L (ref 100–111)
CO2 SERPL-SCNC: 30 MMOL/L (ref 21–32)
CREAT SERPL-MCNC: 1.01 MG/DL (ref 0.6–1.3)
EST. AVERAGE GLUCOSE BLD GHB EST-MCNC: 120 MG/DL
GLUCOSE SERPL-MCNC: 92 MG/DL (ref 74–99)
HBA1C MFR BLD: 5.8 % (ref 4.2–5.6)
POTASSIUM SERPL-SCNC: 4.4 MMOL/L (ref 3.5–5.5)
SODIUM SERPL-SCNC: 138 MMOL/L (ref 136–145)

## 2024-07-25 PROCEDURE — 3077F SYST BP >= 140 MM HG: CPT | Performed by: STUDENT IN AN ORGANIZED HEALTH CARE EDUCATION/TRAINING PROGRAM

## 2024-07-25 PROCEDURE — G2211 COMPLEX E/M VISIT ADD ON: HCPCS | Performed by: STUDENT IN AN ORGANIZED HEALTH CARE EDUCATION/TRAINING PROGRAM

## 2024-07-25 PROCEDURE — 99214 OFFICE O/P EST MOD 30 MIN: CPT | Performed by: STUDENT IN AN ORGANIZED HEALTH CARE EDUCATION/TRAINING PROGRAM

## 2024-07-25 PROCEDURE — 1123F ACP DISCUSS/DSCN MKR DOCD: CPT | Performed by: STUDENT IN AN ORGANIZED HEALTH CARE EDUCATION/TRAINING PROGRAM

## 2024-07-25 PROCEDURE — 80048 BASIC METABOLIC PNL TOTAL CA: CPT

## 2024-07-25 PROCEDURE — 83036 HEMOGLOBIN GLYCOSYLATED A1C: CPT

## 2024-07-25 PROCEDURE — 36415 COLL VENOUS BLD VENIPUNCTURE: CPT

## 2024-07-25 PROCEDURE — 3079F DIAST BP 80-89 MM HG: CPT | Performed by: STUDENT IN AN ORGANIZED HEALTH CARE EDUCATION/TRAINING PROGRAM

## 2024-07-25 RX ORDER — LISINOPRIL 10 MG/1
10 TABLET ORAL DAILY
Qty: 90 TABLET | Refills: 1 | Status: SHIPPED | OUTPATIENT
Start: 2024-07-25

## 2024-07-25 NOTE — PROGRESS NOTES
Joe Norton is a 83 y.o. year old male who presents today for   Chief Complaint   Patient presents with    Hypertension       Is someone accompanying this pt? Yes     Is the patient using any DME equipment during OV? No     Depression Screenin/15/2024     1:29 PM 2023    11:02 AM   PHQ-9 Questionaire   Little interest or pleasure in doing things 1 0   Feeling down, depressed, or hopeless 2 0   Trouble falling or staying asleep, or sleeping too much 3    Feeling tired or having little energy 3    Poor appetite or overeating 3    Feeling bad about yourself - or that you are a failure or have let yourself or your family down 0    Trouble concentrating on things, such as reading the newspaper or watching television 0    Moving or speaking so slowly that other people could have noticed. Or the opposite - being so fidgety or restless that you have been moving around a lot more than usual 1    Thoughts that you would be better off dead, or of hurting yourself in some way 0    PHQ-9 Total Score 13 0   If you checked off any problems, how difficult have these problems made it for you to do your work, take care of things at home, or get along with other people? 0        Abuse Screenin/6/2023    11:00 AM   AMB Abuse Screening   Do you ever feel afraid of your partner? N   Are you in a relationship with someone who physically or mentally threatens you? N   Is it safe for you to go home? Y       Learning Assessment:  No question data found.    Fall Risk:      2/15/2024     1:23 PM 2023    11:03 AM   Fall Risk   Do you feel unsteady or are you worried about falling?  yes no   2 or more falls in past year?  no   Fall with injury in past year? no no           Coordination of Care:   1. \"Have you been to the ER, urgent care clinic since your last visit?  Hospitalized since your last visit?\" No     2. \"Have you seen or consulted any other health care providers outside of the [facility]

## 2024-07-25 NOTE — PATIENT INSTRUCTIONS
To find a neurology clinic you need to call your insurance company for a list of clinics they cover.  Find one that can see you when you prefer and ask us to send the referral to them.    You can also keep the other neurology appointment.    I have also referred you to ENT for the vertigo.    You can do a few things at home to improve your blood pressure.  Less salt in your diet.  Stop or reduce alcohol and cigarettes consumption.  Increase exercise or exercise more often.    We discussed blood pressure (BP) monitoring at home.  You should be seated for 10 minutes and completely relaxed before checking BP.  Measure BP once per day, at least 3 days pr week, and around the same time each day. Please keep a record of the readings and bring them into your next visit.  If BP is elevated (top number is 140 or greater or bottom number is 90 or greater), check the blood pressure again in 1 hour.  If you experiences chest pain, shortness of breath, severe headache, new vision changes, or new numbness/weakness associated with high blood pressure please go to the ER for evaluation.

## 2024-07-25 NOTE — PROGRESS NOTES
Joe Norton (:  1940) is a 83 y.o. male here for evaluation of the following chief complaint(s):  Hypertension        ASSESSMENT/PLAN:  1. Essential (primary) hypertension  Assessment & Plan:  - uncontrolled  - start lisinopril  - BMP today  - RTC x 1 month   Orders:  -     lisinopril (PRINIVIL;ZESTRIL) 10 MG tablet; Take 1 tablet by mouth daily, Disp-90 tablet, R-1Normal  -     Basic Metabolic Panel; Future  2. Vertigo  Assessment & Plan:  - keep neuro appt  - ENT referral  Orders:  -     External Referral To ENT  3. Prediabetes  Assessment & Plan:  - update A1C   Orders:  -     Hemoglobin A1C; Future      Follow-up and Dispositions    Return in about 1 month (around 2024) for Hypertension.         SUBJECTIVE/OBJECTIVE:  HPI  HTN  - uncontrolled; ready to start meds    Vertigo  - spinning sensation after getting out of bed; only occurs in the AM  - started last week and now daily; spinning lasts about 1 minute   - has residual fatigue for about an hour after  - similar sxs occurred 30 years ago  Update (24)  - become more frequent; fear of falling    ROS as stated above.    BP (!) 169/80 (Site: Left Upper Arm, Position: Sitting, Cuff Size: Small Adult)   Pulse 58   Temp 97.3 °F (36.3 °C) (Temporal)   Resp 16   Ht 1.676 m (5' 6\")   Wt 52.8 kg (116 lb 6.4 oz)   SpO2 99%   BMI 18.79 kg/m²     Physical Exam  Vitals and nursing note reviewed.   Constitutional:       Appearance: He is not ill-appearing.   Cardiovascular:      Rate and Rhythm: Normal rate and regular rhythm.      Pulses: Normal pulses.      Heart sounds: Normal heart sounds.      Comments: - no pedal edema  Pulmonary:      Effort: Pulmonary effort is normal.      Comments: - very slight wheeze bilaterally  Neurological:      Mental Status: He is alert and oriented to person, place, and time. Mental status is at baseline.   Psychiatric:         Mood and Affect: Mood normal.               An electronic signature was used to

## 2024-08-29 ENCOUNTER — TELEPHONE (OUTPATIENT)
Facility: CLINIC | Age: 84
End: 2024-08-29

## 2024-08-29 ENCOUNTER — OFFICE VISIT (OUTPATIENT)
Facility: CLINIC | Age: 84
End: 2024-08-29

## 2024-08-29 VITALS
DIASTOLIC BLOOD PRESSURE: 69 MMHG | BODY MASS INDEX: 19.03 KG/M2 | HEART RATE: 82 BPM | HEIGHT: 66 IN | TEMPERATURE: 97.8 F | RESPIRATION RATE: 13 BRPM | OXYGEN SATURATION: 98 % | WEIGHT: 118.4 LBS | SYSTOLIC BLOOD PRESSURE: 110 MMHG

## 2024-08-29 DIAGNOSIS — I10 ESSENTIAL (PRIMARY) HYPERTENSION: ICD-10-CM

## 2024-08-29 DIAGNOSIS — R42 VERTIGO: Primary | ICD-10-CM

## 2024-08-29 NOTE — PROGRESS NOTES
Joe Norton is a 84 y.o. year old male who presents today for   Chief Complaint   Patient presents with    Hypertension       Is someone accompanying this pt? Yes     Is the patient using any DME equipment during OV? No     Depression Screenin/15/2024     1:29 PM 2023    11:02 AM   PHQ-9 Questionaire   Little interest or pleasure in doing things 1 0   Feeling down, depressed, or hopeless 2 0   Trouble falling or staying asleep, or sleeping too much 3    Feeling tired or having little energy 3    Poor appetite or overeating 3    Feeling bad about yourself - or that you are a failure or have let yourself or your family down 0    Trouble concentrating on things, such as reading the newspaper or watching television 0    Moving or speaking so slowly that other people could have noticed. Or the opposite - being so fidgety or restless that you have been moving around a lot more than usual 1    Thoughts that you would be better off dead, or of hurting yourself in some way 0    PHQ-9 Total Score 13 0   If you checked off any problems, how difficult have these problems made it for you to do your work, take care of things at home, or get along with other people? 0        Abuse Screenin/6/2023    11:00 AM   AMB Abuse Screening   Do you ever feel afraid of your partner? N   Are you in a relationship with someone who physically or mentally threatens you? N   Is it safe for you to go home? Y       Learning Assessment:  No question data found.    Fall Risk:      2/15/2024     1:23 PM 2023    11:03 AM   Fall Risk   Do you feel unsteady or are you worried about falling?  yes no   2 or more falls in past year?  no   Fall with injury in past year? no no           Coordination of Care:   1. \"Have you been to the ER, urgent care clinic since your last visit?  Hospitalized since your last visit?\" No     2. \"Have you seen or consulted any other health care providers outside of the [facility]

## 2024-08-29 NOTE — PROGRESS NOTES
Joe Norton (:  1940) is a 84 y.o. male here for evaluation of the following chief complaint(s):  Hypertension        ASSESSMENT/PLAN:  1. Vertigo  Assessment & Plan:  - keep neuro appt   2. Essential (primary) hypertension  Assessment & Plan:  - controlled, no medication changes       Follow-up and Dispositions    Return in about 3 months (around 2024) for check up.         SUBJECTIVE/OBJECTIVE:  HPI  HTN  - uncontrolled; ready to start meds  Update (24)  - reports      Vertigo  - spinning sensation after getting out of bed; only occurs in the AM  - started last week and now daily; spinning lasts about 1 minute   - has residual fatigue for about an hour after  - similar sxs occurred 30 years ago  Update (24)  - become more frequent; fear of falling  Update (24)  - still positional dizziness only when getting out of bed in the AM; does not occur when he takes afternoon naps    ROS as stated above.    /69 (Site: Left Upper Arm, Position: Sitting, Cuff Size: Small Adult)   Pulse 82   Temp 97.8 °F (36.6 °C) (Temporal)   Resp 13   Ht 1.676 m (5' 6\")   Wt 53.7 kg (118 lb 6.4 oz)   SpO2 98%   BMI 19.11 kg/m²     Physical Exam          An electronic signature was used to authenticate this note.    --Lane Leung MD

## 2024-10-22 ENCOUNTER — OFFICE VISIT (OUTPATIENT)
Age: 84
End: 2024-10-22
Payer: MEDICARE

## 2024-10-22 VITALS
HEIGHT: 66 IN | WEIGHT: 121.4 LBS | HEART RATE: 88 BPM | SYSTOLIC BLOOD PRESSURE: 108 MMHG | TEMPERATURE: 98.5 F | OXYGEN SATURATION: 95 % | BODY MASS INDEX: 19.51 KG/M2 | DIASTOLIC BLOOD PRESSURE: 64 MMHG

## 2024-10-22 DIAGNOSIS — G25.0 ESSENTIAL TREMOR: Primary | ICD-10-CM

## 2024-10-22 PROCEDURE — 3078F DIAST BP <80 MM HG: CPT | Performed by: STUDENT IN AN ORGANIZED HEALTH CARE EDUCATION/TRAINING PROGRAM

## 2024-10-22 PROCEDURE — 4004F PT TOBACCO SCREEN RCVD TLK: CPT | Performed by: STUDENT IN AN ORGANIZED HEALTH CARE EDUCATION/TRAINING PROGRAM

## 2024-10-22 PROCEDURE — 1159F MED LIST DOCD IN RCRD: CPT | Performed by: STUDENT IN AN ORGANIZED HEALTH CARE EDUCATION/TRAINING PROGRAM

## 2024-10-22 PROCEDURE — G8427 DOCREV CUR MEDS BY ELIG CLIN: HCPCS | Performed by: STUDENT IN AN ORGANIZED HEALTH CARE EDUCATION/TRAINING PROGRAM

## 2024-10-22 PROCEDURE — 99204 OFFICE O/P NEW MOD 45 MIN: CPT | Performed by: STUDENT IN AN ORGANIZED HEALTH CARE EDUCATION/TRAINING PROGRAM

## 2024-10-22 PROCEDURE — 1126F AMNT PAIN NOTED NONE PRSNT: CPT | Performed by: STUDENT IN AN ORGANIZED HEALTH CARE EDUCATION/TRAINING PROGRAM

## 2024-10-22 PROCEDURE — 3074F SYST BP LT 130 MM HG: CPT | Performed by: STUDENT IN AN ORGANIZED HEALTH CARE EDUCATION/TRAINING PROGRAM

## 2024-10-22 PROCEDURE — 1123F ACP DISCUSS/DSCN MKR DOCD: CPT | Performed by: STUDENT IN AN ORGANIZED HEALTH CARE EDUCATION/TRAINING PROGRAM

## 2024-10-22 PROCEDURE — G8484 FLU IMMUNIZE NO ADMIN: HCPCS | Performed by: STUDENT IN AN ORGANIZED HEALTH CARE EDUCATION/TRAINING PROGRAM

## 2024-10-22 PROCEDURE — G8420 CALC BMI NORM PARAMETERS: HCPCS | Performed by: STUDENT IN AN ORGANIZED HEALTH CARE EDUCATION/TRAINING PROGRAM

## 2024-10-22 RX ORDER — PROPRANOLOL HCL 20 MG
TABLET ORAL
Qty: 60 TABLET | Refills: 3 | Status: SHIPPED | OUTPATIENT
Start: 2024-10-22

## 2024-10-22 ASSESSMENT — ENCOUNTER SYMPTOMS
SHORTNESS OF BREATH: 0
VOMITING: 0
NAUSEA: 0
COUGH: 1
BACK PAIN: 0

## 2024-10-22 NOTE — PROGRESS NOTES
Consider Diabetes      Estimated Avg Glucose 07/25/2024 120  mg/dL Final    Comment: (NOTE)  The eAG should be interpreted with patient characteristics in mind   since ethnicity, interindividual differences, red cell lifespan,   variation in rates of glycation, etc. may affect the validity of the   calculation.      Sodium 07/25/2024 138  136 - 145 mmol/L Final    Potassium 07/25/2024 4.4  3.5 - 5.5 mmol/L Final    Chloride 07/25/2024 105  100 - 111 mmol/L Final    CO2 07/25/2024 30  21 - 32 mmol/L Final    Anion Gap 07/25/2024 3  3.0 - 18 mmol/L Final    Glucose 07/25/2024 92  74 - 99 mg/dL Final    BUN 07/25/2024 23 (H)  7.0 - 18 MG/DL Final    Creatinine 07/25/2024 1.01  0.6 - 1.3 MG/DL Final    BUN/Creatinine Ratio 07/25/2024 23 (H)  12 - 20   Final    Est, Glom Filt Rate 07/25/2024 73  >60 ml/min/1.73m2 Final    Comment:    Pediatric calculator link: https://www.kidney.org/professionals/kdoqi/gfr_calculatorped     These results are not intended for use in patients <18 years of age.     eGFR results are calculated without a race factor using  the 2021 CKD-EPI equation. Careful clinical correlation is recommended, particularly when comparing to results calculated using previous equations.  The CKD-EPI equation is less accurate in patients with extremes of muscle mass, extra-renal metabolism of creatinine, excessive creatine ingestion, or following therapy that affects renal tubular secretion.      Calcium 07/25/2024 9.9  8.5 - 10.1 MG/DL Final         Assessment:  1. Essential tremor  - CT HEAD WO CONTRAST; Future  - propranolol (INDERAL) 20 MG tablet; Take 20 mg PO/day for 1 week; then 20 mg PO BID.  Dispense: 60 tablet; Refill: 3  - TSH; Future  Tremor is most likely essential tremor.  There is no obvious family history.  No alcohol abuse.  Will get CT scan of his head.  Will check his TSH level will start him on propranolol: 20 mg p.o. per day for about a week and then 20 mg p.o. twice daily.  Discussed

## 2024-10-22 NOTE — PATIENT INSTRUCTIONS
Will get CT scan of the head  Start Propranolol 20 mg tab: take 1 tab daily for a week; then take 1 tab twice a day.

## 2024-10-28 ENCOUNTER — TELEPHONE (OUTPATIENT)
Facility: CLINIC | Age: 84
End: 2024-10-28

## 2024-10-28 NOTE — TELEPHONE ENCOUNTER
Pt called stating he had a message to call the office.  Provided pt with the phone number for referral to ENT.  He will call to schedule appt.    Thank you.

## 2024-11-14 ENCOUNTER — OFFICE VISIT (OUTPATIENT)
Facility: CLINIC | Age: 84
End: 2024-11-14

## 2024-11-14 VITALS
WEIGHT: 122 LBS | HEART RATE: 91 BPM | DIASTOLIC BLOOD PRESSURE: 76 MMHG | BODY MASS INDEX: 19.61 KG/M2 | HEIGHT: 66 IN | RESPIRATION RATE: 15 BRPM | OXYGEN SATURATION: 97 % | SYSTOLIC BLOOD PRESSURE: 117 MMHG | TEMPERATURE: 98.2 F

## 2024-11-14 DIAGNOSIS — I10 ESSENTIAL (PRIMARY) HYPERTENSION: Primary | ICD-10-CM

## 2024-11-14 DIAGNOSIS — Z91.81 AT HIGH RISK FOR FALLS: ICD-10-CM

## 2024-11-14 RX ORDER — LISINOPRIL 10 MG/1
5 TABLET ORAL DAILY
Qty: 90 TABLET | Refills: 1 | Status: SHIPPED | OUTPATIENT
Start: 2024-11-14

## 2024-11-14 SDOH — ECONOMIC STABILITY: FOOD INSECURITY: WITHIN THE PAST 12 MONTHS, YOU WORRIED THAT YOUR FOOD WOULD RUN OUT BEFORE YOU GOT MONEY TO BUY MORE.: NEVER TRUE

## 2024-11-14 SDOH — ECONOMIC STABILITY: FOOD INSECURITY: WITHIN THE PAST 12 MONTHS, THE FOOD YOU BOUGHT JUST DIDN'T LAST AND YOU DIDN'T HAVE MONEY TO GET MORE.: NEVER TRUE

## 2024-11-14 SDOH — ECONOMIC STABILITY: INCOME INSECURITY: HOW HARD IS IT FOR YOU TO PAY FOR THE VERY BASICS LIKE FOOD, HOUSING, MEDICAL CARE, AND HEATING?: NOT HARD AT ALL

## 2024-11-14 NOTE — PATIENT INSTRUCTIONS
January 12, 2022       Bruce S Bernheim, MD  [address] [facility] [address]  Via In Basket      Patient: Nickie Villanueva   YOB: 1940   Date of Visit: 1/12/2022       Dear Dr. Bernheim:    Thank you for referring Nickie Villanueva to me for evaluation. Below are my notes for this visit with her.    If you have questions, please do not hesitate to call me. I look forward to following your patient along with you.      Sincerely,        Kristnia Zamora DO        CC: No Recipients  Kristina Zamora DO  1/12/2022 10:50 AM  Signed  Cardiac Follow-Up    Referring  Physician   Bruce S Bernheim, MD  [address] [facility] [address]  [phone]     HPI   The patient is a 81 year old female who presented for follow-up appointment    Mrs. Villanueva is pleasant 81 y.o. female with PMHx of hypertension, hyperlipidemia, stage I right breast cancer ER/OK negative, HER-2/hammad negative status post modified radical mastectomy in 2005, 4 cycles of chemotherapy with Adriamycin/Cytoxan ( Dr. Hernandez - oncologist ) who presented for follow up on a-fib/hypertension     Patient  was diagnosed with new onset of atrial fibrillation, most likely 2/2 to viral gastroenteritis several years ago. However, exact onset of atrial fibrillation was uncertain. Patient was completely asymptomatic. She was started on anticoagulation with Eliquis 5 mg p.o. twice daily. 2D ECHO revealed LVEF of 35%-40% with global hypokinesis. She was discharged home in stable condition after resolution of her GI symptoms.      Patient reported intermittent brief episodes of palpitations  In the past. She also noticed some dizziness, which she believes was secondary to Eliquis. Patient was not able to tolerate event recorder for 30 days, however there were no episodes of atrial fibrillation during 10 days she was wearing it.      She was in rate controlled atrial fibrillation during the stress test.   Patient had another visit to the emergency room secondary  Please take lisinopril 5 mg. Goal is top number < 150, bottom number <100   to brief episode of palpitations. She self converted to normal sinus rhythm, was reassured and discharged home.     Dizziness resolved after asa was discontinued     Today, Nickie denies any complaints. She is very active. She remains in rate controlled atrial fibrillation   Metoprolol was increased to 100mg po daily after the most recent episode  Left femur intertrochanteric fracture in January s/p left femur IMN on 01/2720  She started walking outside more   No difficulties with medications     Past Medical History:   Past Medical History:   Diagnosis Date   • Allergic drug reaction    • Atrial fibrillation (CMS/HCC)    • Breast cancer (CMS/HCC)    • Chest discomfort    • Diarrhea    • Diastolic heart failure (CMS/HCC)    • Edema    • Encounter for preventive health examination    • Flu vaccine need    • Hematuria    • HTN (hypertension)    • Joint pain      Past Surgical History:   Past Surgical History:   Procedure Laterality Date   • Colonoscopy  08/12/2003    normal colonoscopy 8/12/2003 by Feroz Us   • Hip surgery  01/27/2020    She underwent a left femoral intramedullary nailing for a left comminuted segmental closed intertrochanteric hip fracture on 1/27/2020 by Dr. Neri Davis     Family History:   Family History   Problem Relation Age of Onset   • Cancer, Lung Mother    • Heart Father      Social History:   Social History     Tobacco Use   • Smoking status: Never Smoker   • Smokeless tobacco: Never Used   Vaping Use   • Vaping Use: never used   Substance Use Topics   • Alcohol use: No   • Drug use: Never     Allergies: ALLERGIES:  No Known Allergies  Medications Including OTC:  Current Outpatient Medications   Medication Sig Dispense Refill   • metoPROLOL succinate (TOPROL-XL) 50 MG 24 hr tablet Take 1 tablet by mouth 2 times daily. 135 tablet 3   • simvastatin (ZOCOR) 40 MG tablet Take 1 tablet by mouth daily. 15 tablet 0   • donepezil (Aricept) 5 MG tablet Take 1 tablet by mouth every evening. 30 tablet  11   • Eliquis 5 MG Tab TAKE 1 TABLET TWICE A  tablet 3   • benazepril (LOTENSIN) 5 MG tablet TAKE 1 TABLET BY MOUTH EVERY DAY 90 tablet 3   • Multiple Vitamins-Minerals (PRESERVISION AREDS 2) Cap Take 2 capsules by mouth daily.      • Calcium 600 MG tablet Take 2 tablets by mouth 2 times daily. TAKE 1 TABLET TWICE A DAY with food      • Multiple Vitamins-Minerals (CENTRUM SILVER) tablet Take 1 tablet by mouth.     • Vitamin D, Cholecalciferol, 400 units tablet Take 1,000 Units by mouth daily.      • estradiol (ESTRACE) 0.1 MG/GM vaginal cream INSERT 2 GRAMS VAGINALLY   WEEKLY 42.5 g 5     No current facility-administered medications for this visit.       ROS  Review of Systems   Constitutional: Negative.    HENT: Negative.    Eyes: Negative.    Respiratory: Negative.    Cardiovascular: Negative.    Gastrointestinal: Negative.    Endocrine: Negative.    Genitourinary: Negative.    Musculoskeletal: Negative.    Skin: Negative.    Allergic/Immunologic: Negative.    Neurological: Negative.    Hematological: Negative.    Psychiatric/Behavioral: Negative.    All other systems reviewed and are negative.      Physical Examination  Visit Vitals  BP (!) 148/56 (BP Location: RUE - Right upper extremity, Patient Position: Standing, Cuff Size: Small Adult)   Pulse 99   Temp 97.2 °F (36.2 °C) (Temporal)   Ht 5' 3\" (1.6 m)   Wt 68.5 kg (151 lb)   SpO2 99%   BMI 26.75 kg/m²       Physical Exam  HENT:      Head: Normocephalic and atraumatic.   Eyes:      Pupils: Pupils are equal, round, and reactive to light.   Neck:      Vascular: No carotid bruit or JVD.      Trachea: Trachea normal.   Cardiovascular:      Rate and Rhythm: Normal rate. Rhythm irregular.      Chest Wall: PMI is not displaced.      Pulses: No decreased pulses.      Heart sounds: S1 normal and S2 normal. No murmur heard.  No friction rub. No S3 or S4 sounds.    Pulmonary:      Effort: No respiratory distress.      Breath sounds: Normal breath sounds. No  decreased breath sounds or wheezing.   Abdominal:      General: There is no distension or abdominal bruit.      Palpations: Abdomen is soft.      Tenderness: There is no abdominal tenderness.   Musculoskeletal:         General: Normal range of motion.      Cervical back: Normal range of motion and neck supple. No edema or erythema. No muscular tenderness.   Skin:     General: Skin is warm.      Coloration: Skin is not pale.      Nails: There is no clubbing.   Neurological:      Mental Status: She is alert and oriented to person, place, and time.      Sensory: No sensory deficit.   Psychiatric:         Speech: Speech normal.         Behavior: Behavior normal. Behavior is cooperative.         All previous records reviewed    Laboratory Data  Lab Services on 01/11/2022   Component Date Value Ref Range Status   • Fasting Status 01/11/2022 12  0 - 999 Hours Final   • Cholesterol 01/11/2022 173  <=199 mg/dL Final   • Triglycerides 01/11/2022 104  <=149 mg/dL Final   • HDL 01/11/2022 50  >=50 mg/dL Final   • LDL 01/11/2022 102  <=129 mg/dL Final   • Non-HDL Cholesterol 01/11/2022 123  mg/dL Final   • Cholesterol/ HDL Ratio 01/11/2022 3.5  <=4.4 Final   • Hemoglobin A1C 01/11/2022 5.5  4.5 - 5.6 % Final   • TSH 01/11/2022 1.237  0.350 - 5.000 mcUnits/mL Final   • Vitamin B12 01/11/2022 844  211 - 911 pg/mL Final   • Folate 01/11/2022 >24.0  >=5.5 ng/mL Final   • Fasting Status 01/11/2022 12  0 - 999 Hours Final   • Sodium 01/11/2022 140  135 - 145 mmol/L Final   • Potassium 01/11/2022 4.2  3.4 - 5.1 mmol/L Final   • Chloride 01/11/2022 107  98 - 107 mmol/L Final   • Carbon Dioxide 01/11/2022 27  21 - 32 mmol/L Final   • Anion Gap 01/11/2022 10  10 - 20 mmol/L Final   • Glucose 01/11/2022 91  70 - 99 mg/dL Final   • BUN 01/11/2022 17  6 - 20 mg/dL Final   • Creatinine 01/11/2022 0.80  0.51 - 0.95 mg/dL Final   • Glomerular Filtration Rate 01/11/2022 69  >=60 Final   • BUN/ Creatinine Ratio 01/11/2022 21  7 - 25 Final   •  Calcium 01/11/2022 9.0  8.4 - 10.2 mg/dL Final   • Bilirubin, Total 01/11/2022 0.6  0.2 - 1.0 mg/dL Final   • GOT/AST 01/11/2022 18  <=37 Units/L Final   • GPT/ALT 01/11/2022 22  <64 Units/L Final   • Alkaline Phosphatase 01/11/2022 81  45 - 117 Units/L Final   • Albumin 01/11/2022 3.5* 3.6 - 5.1 g/dL Final   • Protein, Total 01/11/2022 6.7  6.4 - 8.2 g/dL Final   • Globulin 01/11/2022 3.2  2.0 - 4.0 g/dL Final   • A/G Ratio 01/11/2022 1.1  1.0 - 2.4 Final   • WBC 01/11/2022 7.9  4.2 - 11.0 K/mcL Final   • RBC 01/11/2022 3.87* 4.00 - 5.20 mil/mcL Final   • HGB 01/11/2022 11.9* 12.0 - 15.5 g/dL Final   • HCT 01/11/2022 37.0  36.0 - 46.5 % Final   • MCV 01/11/2022 95.6  78.0 - 100.0 fl Final   • MCH 01/11/2022 30.7  26.0 - 34.0 pg Final   • MCHC 01/11/2022 32.2  32.0 - 36.5 g/dL Final   • RDW-CV 01/11/2022 14.7  11.0 - 15.0 % Final   • RDW-SD 01/11/2022 52.0* 39.0 - 50.0 fL Final   • PLT 01/11/2022 233  140 - 450 K/mcL Final   • NRBC 01/11/2022 0  <=0 /100 WBC Final   • Neutrophil, Percent 01/11/2022 73  % Final   • Lymphocytes, Percent 01/11/2022 16  % Final   • Mono, Percent 01/11/2022 9  % Final   • Eosinophils, Percent 01/11/2022 2  % Final   • Basophils, Percent 01/11/2022 0  % Final   • Immature Granulocytes 01/11/2022 0  % Final   • Absolute Neutrophils 01/11/2022 5.8  1.8 - 7.7 K/mcL Final   • Absolute Lymphocytes 01/11/2022 1.3  1.0 - 4.0 K/mcL Final   • Absolute Monocytes 01/11/2022 0.7  0.3 - 0.9 K/mcL Final   • Absolute Eosinophils  01/11/2022 0.1  0.0 - 0.5 K/mcL Final   • Absolute Basophils 01/11/2022 0.0  0.0 - 0.3 K/mcL Final   • Absolute Immmature Granulocytes 01/11/2022 0.0  0.0 - 0.2 K/mcL Final   Orders Only on 01/26/2021   Component Date Value Ref Range Status   • COLOR, URINALYSIS 01/26/2021 Straw   Final   • APPEARANCE, URINALYSIS 01/26/2021 Clear   Final   • GLUCOSE, URINALYSIS 01/26/2021 Negative  Negative mg/dL Final   • BILIRUBIN, URINALYSIS 01/26/2021 Negative  Negative Final   • KETONES,  URINALYSIS 01/26/2021 Negative  Negative mg/dL Final   • SPECIFIC GRAVITY, URINALYSIS 01/26/2021 1.008  1.005 - 1.030 Final   • OCCULT BLOOD, URINALYSIS 01/26/2021 Negative  Negative Final   • PH, URINALYSIS 01/26/2021 7.0  5.0 - 7.0 Final   • PROTEIN, URINALYSIS 01/26/2021 Negative  Negative mg/dL Final   • UROBILINOGEN, URINALYSIS 01/26/2021 0.2  0.2, 1.0 mg/dL Final   • NITRITE, URINALYSIS 01/26/2021 Negative  Negative Final   • LEUKOCYTE ESTERASE, URINALYSIS 01/26/2021 Small* Negative Final   • SQUAMOUS EPITHELIAL, URINALYSIS 01/26/2021 1 to 5  None Seen, 1 to 5 /hpf Final   • ERYTHROCYTES, URINALYSIS 01/26/2021 1 to 2  None Seen, 1 to 2 /hpf Final   • LEUKOCYTES, URINALYSIS 01/26/2021 6 to 10* None Seen, 1 to 5 /hpf Final   • BACTERIA, URINALYSIS 01/26/2021 Few* None Seen /hpf Final   • HYALINE CASTS, URINALYSIS 01/26/2021 None Seen  None Seen, 1 to 5 /lpf Final   • MUCUS 01/26/2021 Present   Final   • Urine, Bacterial Culture 01/26/2021 10,000 - 50,000 CFU/mL Mixed bacterial hema with no predominating type   Final   Lab Services on 01/26/2021   Component Date Value Ref Range Status   • Fasting Status 01/26/2021 12  Hours Final   • Sodium 01/26/2021 139  135 - 145 mmol/L Final   • Potassium 01/26/2021 3.9  3.4 - 5.1 mmol/L Final   • Chloride 01/26/2021 103  98 - 107 mmol/L Final   • Carbon Dioxide 01/26/2021 28  21 - 32 mmol/L Final   • Anion Gap 01/26/2021 12  10 - 20 mmol/L Final   • Glucose 01/26/2021 80  65 - 99 mg/dL Final   • BUN 01/26/2021 17  6 - 20 mg/dL Final   • Creatinine 01/26/2021 0.76  0.51 - 0.95 mg/dL Final   • Glomerular Filtration Rate 01/26/2021 74* >90 mL/min/1.73m2 Final   • BUN/ Creatinine Ratio 01/26/2021 22  7 - 25 Final   • Calcium 01/26/2021 9.3  8.4 - 10.2 mg/dL Final   • Bilirubin, Total 01/26/2021 0.7  0.2 - 1.0 mg/dL Final   • GOT/AST 01/26/2021 27  <=37 Units/L Final   • GPT/ALT 01/26/2021 23  <64 Units/L Final   • Alkaline Phosphatase 01/26/2021 82  45 - 117 Units/L Final   •  Albumin 01/26/2021 3.9  3.6 - 5.1 g/dL Final   • Protein, Total 01/26/2021 6.7  6.4 - 8.2 g/dL Final   • Globulin 01/26/2021 2.8  2.0 - 4.0 g/dL Final   • A/G Ratio 01/26/2021 1.4  1.0 - 2.4 Final   • Cholesterol 01/26/2021 211* <=199 mg/dL Final   • Triglycerides 01/26/2021 109  <=149 mg/dL Final   • HDL 01/26/2021 68  >=50 mg/dL Final   • LDL 01/26/2021 121  <=129 mg/dL Final   • Non-HDL Cholesterol 01/26/2021 143  mg/dL Final   • Cholesterol/ HDL Ratio 01/26/2021 3.1  <=4.4 Final   • TSH 01/26/2021 2.232  0.350 - 5.000 mcUnits/mL Final   • Vitamin D, 25-Hydroxy 01/26/2021 46.5  30.0 - 100.0 ng/mL Final   • WBC 01/26/2021 8.4  4.2 - 11.0 K/mcL Final   • RBC 01/26/2021 4.41  4.00 - 5.20 mil/mcL Final   • HGB 01/26/2021 13.4  12.0 - 15.5 g/dL Final   • HCT 01/26/2021 41.0  36.0 - 46.5 % Final   • MCV 01/26/2021 93.0  78.0 - 100.0 fl Final   • MCH 01/26/2021 30.4  26.0 - 34.0 pg Final   • MCHC 01/26/2021 32.7  32.0 - 36.5 g/dL Final   • RDW-CV 01/26/2021 14.5  11.0 - 15.0 % Final   • RDW-SD 01/26/2021 49.2  39.0 - 50.0 fL Final   • PLT 01/26/2021 143  140 - 450 K/mcL Final   • NRBC 01/26/2021 0  <=0 /100 WBC Final   • Neutrophil, Percent 01/26/2021 68  % Final   • Lymphocytes, Percent 01/26/2021 21  % Final   • Mono, Percent 01/26/2021 9  % Final   • Eosinophils, Percent 01/26/2021 1  % Final   • Basophils, Percent 01/26/2021 1  % Final   • Immature Granulocytes 01/26/2021 0  % Final   • Absolute Neutrophils 01/26/2021 5.8  1.8 - 7.7 K/mcL Final   • Absolute Lymphocytes 01/26/2021 1.8  1.0 - 4.0 K/mcL Final   • Absolute Monocytes 01/26/2021 0.7  0.3 - 0.9 K/mcL Final   • Absolute Eosinophils  01/26/2021 0.1  0.0 - 0.5 K/mcL Final   • Absolute Basophils 01/26/2021 0.0  0.0 - 0.3 K/mcL Final   • Absolute Immmature Granulocytes 01/26/2021 0.0  0.0 - 0.2 K/mcL Final   Lab Services on 03/16/2020   Component Date Value Ref Range Status   • VITAMIND, 25 HYDROXY 03/16/2020 61.1  30.0 - 100.0 ng/mL Final   • TSH 03/16/2020  1.523  0.350 - 5.000 mcUnits/mL Final   • FASTING STATUS 03/16/2020 3  hrs Final   • CHOLESTEROL 03/16/2020 178  <200 mg/dL Final   • CALCULATED LDL 03/16/2020 102  <130 mg/dL Final   • HDL 03/16/2020 53  >49 mg/dL Final   • TRIGLYCERIDE 03/16/2020 113  <150 mg/dL Final   • CALCULATED NON HDL 03/16/2020 125  mg/dL Final   • CHOL/HDL 03/16/2020 3.4  <4.5 Final   • Fasting Status 03/16/2020 3  hrs Final   • Sodium 03/16/2020 141  135 - 145 mmol/L Final   • Potassium 03/16/2020 4.1  3.4 - 5.1 mmol/L Final   • Chloride 03/16/2020 107  98 - 107 mmol/L Final   • Carbon Dioxide 03/16/2020 26  21 - 32 mmol/L Final   • Anion Gap 03/16/2020 12  10 - 20 mmol/L Final   • Glucose 03/16/2020 91  65 - 99 mg/dL Final   • BUN 03/16/2020 14  6 - 20 mg/dL Final   • Creatinine 03/16/2020 0.69  0.51 - 0.95 mg/dL Final   • GFR Estimate,  03/16/2020 >90   Final   • GFR Estimate, Non  03/16/2020 82   Final   • BUN/Creatinine Ratio 03/16/2020 20  7 - 25 Final   • CALCIUM 03/16/2020 9.8  8.4 - 10.2 mg/dL Final   • TOTAL BILIRUBIN 03/16/2020 0.5  0.2 - 1.0 mg/dL Final   • AST/SGOT 03/16/2020 15  <38 Units/L Final   • ALT/SGPT 03/16/2020 18  <64 Units/L Final   • ALK PHOSPHATASE 03/16/2020 111  45 - 117 Units/L Final   • TOTAL PROTEIN 03/16/2020 6.9  6.4 - 8.2 g/dL Final   • Albumin 03/16/2020 3.6  3.6 - 5.1 g/dL Final   • GLOBULIN 03/16/2020 3.3  2.0 - 4.0 g/dL Final   • A/G Ratio, Serum 03/16/2020 1.1  1.0 - 2.4 Final   • WBC 03/16/2020 8.4  4.2 - 11.0 K/mcL Final   • RBC 03/16/2020 4.04  4.00 - 5.20 mil/mcL Final   • HGB 03/16/2020 12.2  12.0 - 15.5 g/dL Final   • HCT 03/16/2020 39.4  36.0 - 46.5 % Final   • MCV 03/16/2020 97.5  78.0 - 100.0 fl Final   • MCH 03/16/2020 30.2  26.0 - 34.0 pg Final   • MCHC 03/16/2020 31.0* 32.0 - 36.5 g/dL Final   • RDW-CV 03/16/2020 15.4* 11.0 - 15.0 % Final   • PLT 03/16/2020 223  140 - 450 K/mcL Final   • NRBC 03/16/2020 0  0 /100 WBC Final   • DIFF TYPE 03/16/2020 AUTOMATED  DIFFERENTIAL   Final   • Neutrophil 03/16/2020 68  % Final   • LYMPH 03/16/2020 17  % Final   • MONO 03/16/2020 12  % Final   • EOSIN 03/16/2020 2  % Final   • BASO 03/16/2020 1  % Final   • Percent Immature Granuloctyes 03/16/2020 0  % Final   • Absolute Neutrophil 03/16/2020 5.7  1.8 - 7.7 K/mcL Final   • Absolute Lymph 03/16/2020 1.4  1.0 - 4.0 K/mcL Final   • Absolute Mono 03/16/2020 1.0* 0.3 - 0.9 K/mcL Final   • Absolute Eos 03/16/2020 0.2  0.1 - 0.5 K/mcL Final   • Absolute Baso 03/16/2020 0.1  0.0 - 0.3 K/mcL Final   • Absolute Immature Granulocytes 03/16/2020 0.0  0 - 0.2 K/mcl Final   Orders Only on 02/21/2020   Component Date Value Ref Range Status   • WBC 02/21/2020 11.9* 4.2 - 11.0 K/mcL Final   • RBC 02/21/2020 3.99* 4.00 - 5.20 mil/mcL Final   • HGB 02/21/2020 12.2  12.0 - 15.5 g/dL Final   • HCT 02/21/2020 38.6  36.0 - 46.5 % Final   • MCV 02/21/2020 96.7  78.0 - 100.0 fl Final   • MCH 02/21/2020 30.6  26.0 - 34.0 pg Final   • MCHC 02/21/2020 31.6* 32.0 - 36.5 g/dL Final   • RDW-CV 02/21/2020 16.5* 11.0 - 15.0 % Final   • PLT 02/21/2020 295  140 - 450 K/mcL Final   • NRBC 02/21/2020 0  0 /100 WBC Final   • DIFF TYPE 02/21/2020 AUTOMATED DIFFERENTIAL   Final   • Neutrophil 02/21/2020 77  % Final   • LYMPH 02/21/2020 11  % Final   • MONO 02/21/2020 11  % Final   • EOSIN 02/21/2020 1  % Final   • BASO 02/21/2020 0  % Final   • Percent Immature Granuloctyes 02/21/2020 0  % Final   • Absolute Neutrophil 02/21/2020 9.1* 1.8 - 7.7 K/mcL Final   • Absolute Lymph 02/21/2020 1.4  1.0 - 4.0 K/mcL Final   • Absolute Mono 02/21/2020 1.3* 0.3 - 0.9 K/mcL Final   • Absolute Eos 02/21/2020 0.1  0.1 - 0.5 K/mcL Final   • Absolute Baso 02/21/2020 0.1  0.0 - 0.3 K/mcL Final   • Absolute Immature Granulocytes 02/21/2020 0.0  0 - 0.2 K/mcl Final   Orders Only on 02/21/2020   Component Date Value Ref Range Status   • Fasting Status 02/21/2020 0  hrs Final   • Sodium 02/21/2020 137  135 - 145 mmol/L Final   • Potassium  02/21/2020 3.6  3.4 - 5.1 mmol/L Final   • Chloride 02/21/2020 102  98 - 107 mmol/L Final   • Carbon Dioxide 02/21/2020 29  21 - 32 mmol/L Final   • Anion Gap 02/21/2020 10  10 - 20 mmol/L Final   • Glucose 02/21/2020 111* 65 - 99 mg/dL Final   • BUN 02/21/2020 23* 6 - 20 mg/dL Final   • Creatinine 02/21/2020 0.77  0.51 - 0.95 mg/dL Final   • GFR Estimate,  02/21/2020 85   Final   • GFR Estimate, Non  02/21/2020 73   Final   • BUN/Creatinine Ratio 02/21/2020 30* 7 - 25 Final   • CALCIUM 02/21/2020 10.0  8.4 - 10.2 mg/dL Final   • TOTAL BILIRUBIN 02/21/2020 0.9  0.2 - 1.0 mg/dL Final   • AST/SGOT 02/21/2020 14  <38 Units/L Final   • ALT/SGPT 02/21/2020 16  <64 Units/L Final   • ALK PHOSPHATASE 02/21/2020 154* 45 - 117 Units/L Final   • TOTAL PROTEIN 02/21/2020 6.9  6.4 - 8.2 g/dL Final   • Albumin 02/21/2020 3.6  3.6 - 5.1 g/dL Final   • GLOBULIN 02/21/2020 3.3  2.0 - 4.0 g/dL Final   • A/G Ratio, Serum 02/21/2020 1.1  1.0 - 2.4 Final       EKG   Results for orders placed or performed in visit on 10/12/17   ELECTROCARDIOGRAM 12-LEAD    Narrative    Ordered by an unspecified provider.     EKG today: atrial fibrillation with controlled ventricular rate, PVC's    ECHO:  1. Left ventricle: The cavity size is normal. Wall thickness is normal.     The ejection fraction was measured by biplane method of disks. The     tissue Doppler parameters are abnormal. Left ventricular diastolic     function parameters are indeterminate at present time. The ejection     fraction is 50%.  2. Aortic valve: The annulus is normal. Structurally normal valve. The     valve is trileaflet. The leaflets are normal thickness. Mild     regurgitation.  3. Mitral valve: Mild regurgitation.  4. Left atrium: The atrium is dilated.  5. Right ventricle: Systolic pressure is within the normal range. The     estimated peak pressure is 32mm Hg.  6. Tricuspid valve: Mild regurgitation.  7. Pericardium, extracardiac: There is  no pericardial effusion.  Impressions:  No significant change in comparison to the previous study     Assessment/Plan  Problem List Items Addressed This Visit        Cardiac and Vasculature    Benign essential hypertension    Relevant Medications    metoPROLOL succinate (TOPROL-XL) 50 MG 24 hr tablet    Hyperlipidemia    Relevant Medications    metoPROLOL succinate (TOPROL-XL) 50 MG 24 hr tablet    Chronic systolic (congestive) heart failure (CMS/HCC) - Primary    Relevant Medications    metoPROLOL succinate (TOPROL-XL) 50 MG 24 hr tablet    Paroxysmal atrial fibrillation (CMS/HCC)    Relevant Medications    metoPROLOL succinate (TOPROL-XL) 50 MG 24 hr tablet    Chronic diastolic heart failure (CMS/HCC)    Relevant Medications    metoPROLOL succinate (TOPROL-XL) 50 MG 24 hr tablet      Mrs. Villanueva is pleasant 80 y.o. female with PMHx of hypertension, hyperlipidemia, stage I right breast cancer ER/LA negative, HER-2/hammad negative status post modified radical mastectomy in 2005, 4 cycles of chemotherapy with Adriamycin/Cytoxan ( Dr. Hernandez - oncologist ) who presented for follow up.      1.Paroxysmal atrial fibrillation - now in a-fib  [id number] score is at least 3, which correlates with elevated stroke risk  Continue Eliquis 5mg po bid, weight >60kg and creatinine < 1.5  Metoprolol succinate 50mg  Po daily      2. 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   Patient had previous exposure to cardiotoxic chemotherapy with Adriamycin and Cytoxan.   Exercise nuclear medicine stress test - negative for ischemia  Continue GDMT for systolic heart failure:   Increase metoprolol succinate to 50 mg po bid, benazepril 5mg po daily  Repeated 2D ECHO on 11/06/18 - LVEF 55%-60%, mild MR  Repeat ECHO IN 2021 - LVEF 50%, mild MR, RVSP is 32mmHg      3. Chronic Congestive diastolic CHF - compensated  4. Dizziness - resolved  5. Hypertension - controlled  6. Hyperlipidemia - controlled on simvastatin  7.  New diagnosis of mild dementia      I reviewed all previous records and discussed with patient      Kristina Zamora D.O.     Return in about 6 months (around 7/12/2022).                       Kristina Zamora D.O.

## 2024-11-14 NOTE — PROGRESS NOTES
Joe Norton is a 84 y.o. year old male who presents today for   Chief Complaint   Patient presents with    Wrist Pain       \"Have you been to the ER, urgent care clinic since your last visit?  Hospitalized since your last visit?\"    Yes 10/28     “Have you seen or consulted any other health care providers outside our system since your last visit?”    no          Click Here for Release of Records Request    - MARTÍN Smith  [facility]  Phone: [phone]  Fax: [phone]

## 2024-11-14 NOTE — PROGRESS NOTES
Joe Norton (:  1940) is a 84 y.o. male here for evaluation of the following chief complaint(s):  Wrist Pain        ASSESSMENT/PLAN:  1. Essential (primary) hypertension  Assessment & Plan:  - controlled, but with some lightheadedness  - reduce lisinopril to 5   Orders:  -     lisinopril (PRINIVIL;ZESTRIL) 10 MG tablet; Take 0.5 tablets by mouth daily, Disp-90 tablet, R-1Normal  -     Ambulatory Referral to Care Management  2. At high risk for falls  -     Ambulatory Referral to Care Management  - reduced lisinopril to 5 to avoid orthostatics; slightly elevated BP is favorable to falls    Follow-up and Dispositions    Return in about 3 months (around 2025).         SUBJECTIVE/OBJECTIVE:  HPI  PNA  - [facility] admission 10/28-10/30  - completed ABX (cefuroxime and doxyxyxline)  - feels well    HTN  - reports some lightheadedness, with a single fall    ROS as stated above.    /76   Pulse 91   Temp 98.2 °F (36.8 °C) (Temporal)   Resp 15   Ht 1.676 m (5' 6\")   Wt 55.3 kg (122 lb)   SpO2 97%   BMI 19.69 kg/m²     Physical Exam          An electronic signature was used to authenticate this note.    --Lane Leung MD On the basis of positive falls risk screening, assessment and plan is as follows: home safety tips provided, medications adjusted- reduced lisinopril to 5 .

## 2024-11-15 ENCOUNTER — CARE COORDINATION (OUTPATIENT)
Facility: CLINIC | Age: 84
End: 2024-11-15

## 2024-11-15 NOTE — CARE COORDINATION
prescriptions and are they filled?: Yes  Barriers to medication adherence: None  Are you able to afford your medications?: Yes  How often do you have trouble taking your medications the way you have been told to take them?: I always take them as prescribed.     Do you have Home O2 Therapy?: No         Current Housing: Private Residence                 Suggested Interventions and Community Resources               ,   COPD Assessment       No patient-reported symptoms         Symptoms:           ,   Hypertension - Encounter Level          ,   General Assessment    Do you have any symptoms that are causing concern?: No          Medications Reviewed:   Not completed during this call: to complete at follow up outreach    Advance Care Planning:   Not reviewed during this call     Care Planning:    Goals Addressed                   This Visit's Progress     Medication Management   On track     I will take my medication as directed.  I will notify my provider of any problems with medications, like adverse effects or side effects.  I will notify my provider/Care Coordinator if I am unable to afford my medications.  I will notify my provider for advice before I stop taking any of my medication.    Barriers: none  Plan for overcoming my barriers: N/A  Confidence: 10/10  Anticipated Goal Completion Date: 1/15/2025      11/15: Patient reports he has started taking 1/2 tablets of lisinopril               PCP/Specialist follow up:   Future Appointments         Provider Specialty Dept Phone    1/24/2025 10:40 AM Jatin Dela Cruz MD Neurology [phone]    2/14/2025 4:00 PM Lane Leung MD Family Medicine [phone]            Follow Up:   Plan for next [facility] outreach in approximately 1 week to complete:  - CC Protocol assessments  - disease specific assessments  - SDOH assessments  - medication review   - advance care planning   - goal progression.   Patient  is agreeable to this plan.

## 2024-11-22 ENCOUNTER — CARE COORDINATION (OUTPATIENT)
Facility: CLINIC | Age: 84
End: 2024-11-22

## 2024-11-22 NOTE — CARE COORDINATION
Ambulatory Care Coordination Note     2024 11:31 AM     Patient Current Location:  Home: [address] [place] [id number]     ACM contacted the patient by telephone. Verified name and  with patient as identifiers.         ACM: Kimberly Reyes, RN     Challenges to be reviewed by the provider   Additional needs identified to be addressed with provider No  none               Method of communication with provider: none.    Utilization: Patient has not had any utilization since our last call.       Care Summary Note: Patient states he is doing well today.  Patient states he is moving today, currently in the process of moving to new home.  Patient declines any needs or assistance from ACM during this call.  Patient with no further concerns or questions during this call.  Call kept brief due to patient currently moving out of home, patient accepts follow up call to discuss care management next week.      Offered patient enrollment in the Remote Patient Monitoring (RPM) program for in-home monitoring: Yes, but did not enroll at this time: patient declined RPM during previous call .     Assessments Completed:   Ambulatory Care Coordination Assessment    Care Coordination Protocol  Referral from Primary Care Provider: Yes  Week 1 - Initial Assessment     Do you have all of your prescriptions and are they filled?: Yes  Barriers to medication adherence: None  Are you able to afford your medications?: Yes  How often do you have trouble taking your medications the way you have been told to take them?: I always take them as prescribed.     Do you have Home O2 Therapy?: No         Current Housing: Private Residence                 Suggested Interventions and Community Resources               ,   COPD Assessment       No patient-reported symptoms         Symptoms:           ,   Hypertension - Encounter Level          ,   General Assessment    Do you have any symptoms that are causing concern?: No          Medications

## 2024-11-27 ENCOUNTER — CARE COORDINATION (OUTPATIENT)
Facility: CLINIC | Age: 84
End: 2024-11-27

## 2024-11-27 NOTE — CARE COORDINATION
Ambulatory Care Coordination Note     11/27/2024 12:47 PM     Patient outreach attempt by this ACM today to perform care management follow up . ACM was unable to reach the patient by telephone today;   left voice message requesting a return phone call to this ACM.     ACM: Kimberly Reyes, RN     Care Summary Note: ACM left voicemail for return call to patient mobile number listed.     PCP/Specialist follow up:   Future Appointments         Provider Specialty Dept Phone    1/24/2025 10:40 AM Jatin Dela Cruz MD Neurology [phone]    2/14/2025 4:00 PM Lane Leung MD Family Medicine [phone]            Follow Up:   Plan for next AC outreach in approximately 1-2 days  to complete:  - CC Protocol assessments  - disease specific assessments  - SDOH assessments  - medication review  - advance care planning  - goal progression  - RPM.

## 2024-12-03 ENCOUNTER — CARE COORDINATION (OUTPATIENT)
Facility: CLINIC | Age: 84
End: 2024-12-03

## 2024-12-03 NOTE — CARE COORDINATION
Conditions and Symptoms   On track     I will schedule office visits, as directed by my provider.  I will keep my appointment or reschedule if I have to cancel.  I will notify my provider of any barriers to my plan of care.  I will notify my provider of any symptoms that indicate a worsening of my condition.    Barriers: none  Plan for overcoming my barriers: N/A  Confidence: 10/10  Anticipated Goal Completion Date: 2/3/2025             Medication Management   On track     I will take my medication as directed.  I will notify my provider of any problems with medications, like adverse effects or side effects.  I will notify my provider/Care Coordinator if I am unable to afford my medications.  I will notify my provider for advice before I stop taking any of my medication.    Barriers: none  Plan for overcoming my barriers: N/A  Confidence: 10/10  Anticipated Goal Completion Date: 1/15/2025      11/15: Patient reports he has started taking 1/2 tablets of lisinopril               PCP/Specialist follow up:   Future Appointments         Provider Specialty Dept Phone    1/24/2025 10:40 AM Jatin Dela Cruz MD Neurology [phone]    2/14/2025 4:00 PM Lane Leung MD Family Medicine [phone]            Follow Up:   Plan for next AC outreach in approximately 1 week to complete:  - CC Protocol assessments  - disease specific assessments  - SDOH assessments  - medication review   - advance care planning   - goal progression.   Patient  is agreeable to this plan.

## 2024-12-10 ENCOUNTER — CARE COORDINATION (OUTPATIENT)
Facility: CLINIC | Age: 84
End: 2024-12-10

## 2024-12-10 NOTE — CARE COORDINATION
Ambulatory Care Coordination Note     12/10/2024 10:31 AM     Patient outreach attempt by this ACM today to perform care management follow up . ACM was unable to reach the patient by telephone today;   left voice message requesting a return phone call to this ACM.     ACM: Kimberly Reyes, RN     Care Summary Note: ACM left voicemail for return call to patient mobile number listed    PCP/Specialist follow up:   Future Appointments         Provider Specialty Dept Phone    1/24/2025 10:40 AM Jatin Dela Cruz MD Neurology [phone]    2/14/2025 4:00 PM Lane Leung MD Family Medicine [phone]            Follow Up:   Plan for next AC outreach in approximately 1-2 days  to complete:  - CC Protocol assessments  - disease specific assessments  - SDOH assessments  - medication review  - advance care planning  - goal progression.

## 2024-12-13 ENCOUNTER — CARE COORDINATION (OUTPATIENT)
Facility: CLINIC | Age: 84
End: 2024-12-13

## 2024-12-13 NOTE — CARE COORDINATION
Ambulatory Care Coordination Note     12/13/2024 10:35 AM     Patient outreach attempt by this ACM today to perform care management follow up . ACM was unable to reach the patient by telephone today;   left voice message requesting a return phone call to this ACM.     ACM: Kimberly Reyes, RN     Care Summary Note: ACM left voicemail for return call to patient mobile number listed.     PCP/Specialist follow up:   Future Appointments         Provider Specialty Dept Phone    1/24/2025 10:40 AM Jatin Dela Cruz MD Neurology [phone]    2/14/2025 4:00 PM Lane Leung MD Family Medicine [phone]            Follow Up:   Plan for next AC outreach in approximately 1 week to complete:  - CC Protocol assessments  - disease specific assessments  - SDOH assessments  - medication review  - advance care planning  - goal progression.

## 2024-12-23 ENCOUNTER — TELEPHONE (OUTPATIENT)
Age: 84
End: 2024-12-23

## 2024-12-23 DIAGNOSIS — G25.0 ESSENTIAL TREMOR: ICD-10-CM

## 2024-12-23 DIAGNOSIS — I10 ESSENTIAL (PRIMARY) HYPERTENSION: ICD-10-CM

## 2024-12-23 RX ORDER — PROPRANOLOL HCL 20 MG
TABLET ORAL
Qty: 60 TABLET | Refills: 3 | Status: SHIPPED | OUTPATIENT
Start: 2024-12-23

## 2024-12-23 RX ORDER — LISINOPRIL 10 MG/1
5 TABLET ORAL DAILY
Qty: 90 TABLET | Refills: 1 | Status: SHIPPED | OUTPATIENT
Start: 2024-12-23

## 2024-12-23 NOTE — TELEPHONE ENCOUNTER
Medication(s) requesting:   Requested Prescriptions     Pending Prescriptions Disp Refills    lisinopril (PRINIVIL;ZESTRIL) 10 MG tablet 90 tablet 1     Sig: Take 0.5 tablets by mouth daily       Last office visit:  11.14.24  Next office visit DMA: 2/14/2025

## 2024-12-23 NOTE — TELEPHONE ENCOUNTER
[facility] is requesting pt's med refill for:    Lisinopril (PRINIVIL;ZESTRIL) 10 MG tablet [[id number]]     LOV:  11/14/2024  NOV:   2/14/2025    Please assist. Thank you.

## 2024-12-30 ENCOUNTER — CARE COORDINATION (OUTPATIENT)
Facility: CLINIC | Age: 84
End: 2024-12-30

## 2024-12-30 NOTE — CARE COORDINATION
Ambulatory Care Coordination Note     12/30/2024 10:20 AM     Patient outreach attempt by this ACM today to perform care management follow up . ACM was unable to reach the patient by telephone today;   left voice message requesting a return phone call to this ACM.     ACM: Kimberly Reyes, RN     Care Summary Note: ACM left voicemail for return call to patient mobile number listed    PCP/Specialist follow up:   Future Appointments         Provider Specialty Dept Phone    1/24/2025 10:40 AM Jatin Dela Cruz MD Neurology [phone]    2/14/2025 4:00 PM Lane Leung MD Family Medicine [phone]            Follow Up:   Plan for next AC outreach in approximately 1 week to complete:  - CC Protocol assessments  - disease specific assessments  - SDOH assessments  - medication review  - advance care planning  - goal progression.

## 2025-01-09 ENCOUNTER — CARE COORDINATION (OUTPATIENT)
Facility: CLINIC | Age: 85
End: 2025-01-09

## 2025-01-09 NOTE — CARE COORDINATION
Ambulatory Care Coordination Note     1/9/2025 11:26 AM     patient outreach attempt by this ACM today to perform care management follow up . ACM was unable to reach the patient by telephone today;   left voice message requesting a return phone call to this ACM.     Patient closed (patient disengaged) from the High Risk Care Management program on 1/9/2025.

## 2025-01-13 DIAGNOSIS — G25.0 ESSENTIAL TREMOR: ICD-10-CM

## 2025-01-13 DIAGNOSIS — I10 ESSENTIAL (PRIMARY) HYPERTENSION: ICD-10-CM

## 2025-01-13 NOTE — TELEPHONE ENCOUNTER
Patient would like to request a refill on the below medication:      Updated his pharmacy    Please advise    Thank you

## 2025-01-14 RX ORDER — LISINOPRIL 10 MG/1
5 TABLET ORAL DAILY
Qty: 90 TABLET | Refills: 1 | Status: SHIPPED | OUTPATIENT
Start: 2025-01-14

## 2025-01-14 RX ORDER — PROPRANOLOL HCL 20 MG
TABLET ORAL
Qty: 60 TABLET | Refills: 3 | Status: SHIPPED | OUTPATIENT
Start: 2025-01-14

## 2025-01-14 NOTE — TELEPHONE ENCOUNTER
Medication(s) requesting:   Requested Prescriptions     Pending Prescriptions Disp Refills    lisinopril (PRINIVIL;ZESTRIL) 10 MG tablet 90 tablet 1     Sig: Take 0.5 tablets by mouth daily    propranolol (INDERAL) 20 MG tablet 60 tablet 3     Sig: Take 20 mg PO/day for 1 week; then 20 mg PO BID.        Last office visit:  11/14/24  Next office visit DMA: 1/21/2025

## 2025-01-21 ENCOUNTER — OFFICE VISIT (OUTPATIENT)
Facility: CLINIC | Age: 85
End: 2025-01-21

## 2025-01-21 VITALS
BODY MASS INDEX: 19.58 KG/M2 | TEMPERATURE: 97.6 F | SYSTOLIC BLOOD PRESSURE: 144 MMHG | OXYGEN SATURATION: 98 % | RESPIRATION RATE: 14 BRPM | HEIGHT: 66 IN | HEART RATE: 75 BPM | WEIGHT: 121.8 LBS | DIASTOLIC BLOOD PRESSURE: 65 MMHG

## 2025-01-21 DIAGNOSIS — M25.50 ARTHRALGIA, UNSPECIFIED JOINT: Primary | ICD-10-CM

## 2025-01-21 DIAGNOSIS — I10 ESSENTIAL (PRIMARY) HYPERTENSION: ICD-10-CM

## 2025-01-21 SDOH — ECONOMIC STABILITY: FOOD INSECURITY: WITHIN THE PAST 12 MONTHS, YOU WORRIED THAT YOUR FOOD WOULD RUN OUT BEFORE YOU GOT MONEY TO BUY MORE.: NEVER TRUE

## 2025-01-21 SDOH — ECONOMIC STABILITY: FOOD INSECURITY: WITHIN THE PAST 12 MONTHS, THE FOOD YOU BOUGHT JUST DIDN'T LAST AND YOU DIDN'T HAVE MONEY TO GET MORE.: NEVER TRUE

## 2025-01-21 ASSESSMENT — PATIENT HEALTH QUESTIONNAIRE - PHQ9
SUM OF ALL RESPONSES TO PHQ QUESTIONS 1-9: 0
SUM OF ALL RESPONSES TO PHQ9 QUESTIONS 1 & 2: 0
2. FEELING DOWN, DEPRESSED OR HOPELESS: NOT AT ALL
SUM OF ALL RESPONSES TO PHQ QUESTIONS 1-9: 0
SUM OF ALL RESPONSES TO PHQ QUESTIONS 1-9: 0
1. LITTLE INTEREST OR PLEASURE IN DOING THINGS: NOT AT ALL
SUM OF ALL RESPONSES TO PHQ QUESTIONS 1-9: 0

## 2025-01-21 NOTE — ASSESSMENT & PLAN NOTE
- possible reactive arthritis, polymyalgia rheumatica-    - labs to assess  - trial of voltaren gel

## 2025-01-21 NOTE — PROGRESS NOTES
Joe Norton (:  1940) is a 84 y.o. male here for evaluation of the following chief complaint(s):  Joint Pain        ASSESSMENT/PLAN:  1. Arthralgia, unspecified joint  Assessment & Plan:  - possible reactive arthritis, polymyalgia rheumatica-    - labs to assess  - trial of voltaren gel  Orders:  -     RheumAssure; Future  -     Sedimentation Rate; Future  -     C-Reactive Protein; Future  -     CBC with Auto Differential; Future  -     diclofenac sodium (VOLTAREN) 1 % GEL; Apply 2 g topically 4 times daily as needed for Pain To wrists and shoulders, Topical, 4 TIMES DAILY PRN Starting Tue 2025, Disp-150 g, R-2, Normal  2. Essential (primary) hypertension  Assessment & Plan:  - a little high  - improve diet   - will check again in 1-2 weeks      Follow-up and Dispositions    Return in about 1 week (around 2025) for AWV, joint pain fu.         SUBJECTIVE/OBJECTIVE:  HPI  Arthralgia  - worsening severe pain in bilateral wrists and shoulders x 2 weeks  - pain is worse in the AM along with stiffness  - atraumatic   - had PNA about a month ago    HTN  - reports some lightheadedness, with a single fall  Update (25)  - taking lisinopril 5, as discussed in last visit  - home SBP 130s, unknown DBP    ROS as stated above.    BP (!) 144/65 (Site: Left Upper Arm, Position: Sitting, Cuff Size: Small Adult)   Pulse 75   Temp 97.6 °F (36.4 °C) (Temporal)   Resp 14   Ht 1.676 m (5' 6\")   Wt 55.2 kg (121 lb 12.8 oz)   SpO2 98%   BMI 19.66 kg/m²     Physical Exam          An electronic signature was used to authenticate this note.    --Lane Leung MD

## 2025-01-21 NOTE — PATIENT INSTRUCTIONS
I have ordered labs. Please try to complete your labs at least one week before your visit, so we can use the results to make sound treatment decisions.    You can do a few things at home to improve your blood pressure.  Less salt in your diet.  Stop or reduce alcohol and cigarettes consumption.  Increase exercise or exercise more often.    We discussed blood pressure (BP) monitoring at home.  You should be seated for 10 minutes and completely relaxed before checking BP.  Measure BP once per day, at least 3 days pr week, and around the same time each day. Please keep a record of the readings and bring them into your next visit.  If BP is elevated (top number is 140 or greater or bottom number is 90 or greater), check the blood pressure again in 1 hour.  If you experiences chest pain, shortness of breath, severe headache, new vision changes, or new numbness/weakness associated with high blood pressure please go to the ER for evaluation.

## 2025-02-12 ENCOUNTER — HOSPITAL ENCOUNTER (OUTPATIENT)
Facility: HOSPITAL | Age: 85
Setting detail: SPECIMEN
Discharge: HOME | End: 2025-02-15
Payer: MEDICARE

## 2025-02-12 DIAGNOSIS — M25.50 ARTHRALGIA, UNSPECIFIED JOINT: ICD-10-CM

## 2025-02-12 LAB
BASOPHILS # BLD: 0.09 K/UL (ref 0–0.1)
BASOPHILS NFR BLD: 1 % (ref 0–2)
DIFFERENTIAL METHOD BLD: ABNORMAL
EOSINOPHIL # BLD: 0.38 K/UL (ref 0–0.4)
EOSINOPHIL NFR BLD: 4.3 % (ref 0–5)
ERYTHROCYTE [DISTWIDTH] IN BLOOD BY AUTOMATED COUNT: 15.4 % (ref 11.6–14.5)
ERYTHROCYTE [SEDIMENTATION RATE] IN BLOOD: 80 MM/HR (ref 0–20)
HCT VFR BLD AUTO: 38.4 % (ref 36–48)
HGB BLD-MCNC: 12.2 G/DL (ref 13–16)
IMM GRANULOCYTES # BLD AUTO: 0.03 K/UL (ref 0–0.04)
IMM GRANULOCYTES NFR BLD AUTO: 0.3 % (ref 0–0.5)
LYMPHOCYTES # BLD: 2.22 K/UL (ref 0.9–3.6)
LYMPHOCYTES NFR BLD: 24.8 % (ref 21–52)
MCH RBC QN AUTO: 29.3 PG (ref 24–34)
MCHC RBC AUTO-ENTMCNC: 31.8 G/DL (ref 31–37)
MCV RBC AUTO: 92.1 FL (ref 78–100)
MONOCYTES # BLD: 0.76 K/UL (ref 0.05–1.2)
MONOCYTES NFR BLD: 8.5 % (ref 3–10)
NEUTS SEG # BLD: 5.46 K/UL (ref 1.8–8)
NEUTS SEG NFR BLD: 61.1 % (ref 40–73)
NRBC # BLD: 0 K/UL (ref 0–0.01)
NRBC BLD-RTO: 0 PER 100 WBC
PLATELET # BLD AUTO: 364 K/UL (ref 135–420)
PMV BLD AUTO: 11 FL (ref 9.2–11.8)
RBC # BLD AUTO: 4.17 M/UL (ref 4.35–5.65)
WBC # BLD AUTO: 8.9 K/UL (ref 4.6–13.2)

## 2025-02-12 PROCEDURE — 85025 COMPLETE CBC W/AUTO DIFF WBC: CPT

## 2025-02-12 PROCEDURE — 85652 RBC SED RATE AUTOMATED: CPT

## 2025-02-12 PROCEDURE — 86200 CCP ANTIBODY: CPT

## 2025-02-12 PROCEDURE — 86431 RHEUMATOID FACTOR QUANT: CPT

## 2025-02-12 PROCEDURE — 86140 C-REACTIVE PROTEIN: CPT

## 2025-02-12 PROCEDURE — 36415 COLL VENOUS BLD VENIPUNCTURE: CPT

## 2025-02-12 PROCEDURE — 83520 IMMUNOASSAY QUANT NOS NONAB: CPT

## 2025-02-13 LAB — CRP SERPL-MCNC: 0.7 MG/DL (ref 0–0.3)

## 2025-02-20 LAB
14-3-3 ETA AG SER IA-MCNC: 5.08 NG/ML
CCP IGA+IGG SERPL IA-ACNC: >250 UNITS
RHEUMATOID FACT SERPL-ACNC: ABNORMAL UNITS/ML

## 2025-02-24 DIAGNOSIS — M25.50 ARTHRALGIA, UNSPECIFIED JOINT: Primary | ICD-10-CM

## 2025-02-24 LAB
14-3-3 ETA AG SER IA-MCNC: 5.08 NG/ML
CCP IGA+IGG SERPL IA-ACNC: >250 UNITS
RHEUMATOID FACT SERPL-ACNC: <14 UNITS/ML

## 2025-02-25 ENCOUNTER — OFFICE VISIT (OUTPATIENT)
Facility: CLINIC | Age: 85
End: 2025-02-25

## 2025-02-25 VITALS
DIASTOLIC BLOOD PRESSURE: 72 MMHG | TEMPERATURE: 97.3 F | WEIGHT: 124.6 LBS | HEART RATE: 62 BPM | SYSTOLIC BLOOD PRESSURE: 134 MMHG | BODY MASS INDEX: 20.03 KG/M2 | HEIGHT: 66 IN | RESPIRATION RATE: 14 BRPM | OXYGEN SATURATION: 97 %

## 2025-02-25 DIAGNOSIS — M25.50 ARTHRALGIA, UNSPECIFIED JOINT: ICD-10-CM

## 2025-02-25 DIAGNOSIS — Z00.00 MEDICARE ANNUAL WELLNESS VISIT, SUBSEQUENT: Primary | ICD-10-CM

## 2025-02-25 RX ORDER — METHYLPREDNISOLONE 4 MG/1
TABLET ORAL
Qty: 1 KIT | Refills: 0 | Status: SHIPPED | OUTPATIENT
Start: 2025-02-25

## 2025-02-25 SDOH — ECONOMIC STABILITY: FOOD INSECURITY: WITHIN THE PAST 12 MONTHS, YOU WORRIED THAT YOUR FOOD WOULD RUN OUT BEFORE YOU GOT MONEY TO BUY MORE.: NEVER TRUE

## 2025-02-25 SDOH — ECONOMIC STABILITY: FOOD INSECURITY: WITHIN THE PAST 12 MONTHS, THE FOOD YOU BOUGHT JUST DIDN'T LAST AND YOU DIDN'T HAVE MONEY TO GET MORE.: NEVER TRUE

## 2025-02-25 ASSESSMENT — VISUAL ACUITY
OS_CC: 0
OD_CC: 20/30

## 2025-02-25 ASSESSMENT — LIFESTYLE VARIABLES
HOW MANY STANDARD DRINKS CONTAINING ALCOHOL DO YOU HAVE ON A TYPICAL DAY: PATIENT DOES NOT DRINK
HOW OFTEN DO YOU HAVE A DRINK CONTAINING ALCOHOL: NEVER

## 2025-02-25 ASSESSMENT — PATIENT HEALTH QUESTIONNAIRE - PHQ9
SUM OF ALL RESPONSES TO PHQ9 QUESTIONS 1 & 2: 0
SUM OF ALL RESPONSES TO PHQ QUESTIONS 1-9: 0
2. FEELING DOWN, DEPRESSED OR HOPELESS: NOT AT ALL
SUM OF ALL RESPONSES TO PHQ QUESTIONS 1-9: 0
1. LITTLE INTEREST OR PLEASURE IN DOING THINGS: NOT AT ALL

## 2025-02-25 NOTE — PATIENT INSTRUCTIONS
independence, or being kept alive by machines.)  Where would you prefer to die? (Your home? A hospital? A nursing home?)  Do you want to donate your organs when you die?  Do you want certain Spiritism practices performed before you die?  When should you call for help?  Be sure to contact your doctor if you have any questions.  Where can you learn more?  Go to https://www.i[employer].net/patientEd and enter R264 to learn more about \"Advance Directives: Care Instructions.\"  Current as of: November 16, 2023  Content Version: 14.3  © 2024 [employer].   Care instructions adapted under license by [employer]. If you have questions about a medical condition or this instruction, always ask your healthcare professional. [employer], [facility], disclaims any warranty or liability for your use of this information.         A Healthy Heart: Care Instructions  Overview     Coronary artery disease, also called heart disease, occurs when a substance called plaque builds up in the vessels that supply oxygen-rich blood to your heart muscle. This can narrow the blood vessels and reduce blood flow. A heart attack happens when blood flow is completely blocked. A high-fat diet, smoking, and other factors increase the risk of heart disease.  Your doctor has found that you have a chance of having heart disease. A heart-healthy lifestyle can help keep your heart healthy and prevent heart disease. This lifestyle includes eating healthy, being active, staying at a weight that's healthy for you, and not smoking or using tobacco. It also includes taking medicines as directed, managing other health conditions, and trying to get a healthy amount of sleep.  Follow-up care is a key part of your treatment and safety. Be sure to make and go to all appointments, and call your doctor if you are having problems. It's also a good idea to know your test results and keep a list of the medicines you take.  How can you care for yourself at

## 2025-02-25 NOTE — PROGRESS NOTES
Joe Norton (:  1940) is a 84 y.o. male here for evaluation of the following chief complaint(s):  Medicare AWV        ASSESSMENT/PLAN:  1. Medicare annual wellness visit, subsequent  2. Arthralgia, unspecified joint  Assessment & Plan:  - suspect RA vs PMR  - trial of steroids  - rheum referral   Orders:  -     methylPREDNISolone (MEDROL, GIULIANA,) 4 MG tablet; Take by mouth.  Use according to package instructions, Disp-1 kit, R-0Normal  -     External Referral To Rheumatology      Follow-up and Dispositions    Return in 3 months (on 2025).         SUBJECTIVE/OBJECTIVE:  HPI  Arthralgia  - worsening severe pain in bilateral wrists and shoulders x 2 weeks  - pain is worse in the AM along with stiffness  - atraumatic   - had PNA about a month ago  Update (25)  - CRP 0.7, ESR 80, CCP >250    ROS as stated above.    /72 (Site: Left Upper Arm, Position: Sitting, Cuff Size: Small Adult)   Pulse 62   Temp 97.3 °F (36.3 °C) (Temporal)   Resp 14   Ht 1.676 m (5' 6\")   Wt 56.5 kg (124 lb 9.6 oz)   SpO2 97%   BMI 20.11 kg/m²     Physical Exam          An electronic signature was used to authenticate this note.    --Lane Leung MD

## 2025-02-25 NOTE — PROGRESS NOTES
Medicare Annual Wellness Visit    Joe Norton is here for Medicare AWV    Assessment & Plan   Medicare annual wellness visit, subsequent     Return in 3 months (on 5/25/2025).     Subjective       Patient's complete Health Risk Assessment and screening values have been reviewed and are found in Flowsheets. The following problems were reviewed today and where indicated follow up appointments were made and/or referrals ordered.    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  Interventions:   Patient comments: no complaints      Advanced Directives:  Do you have a Living Will?: (!) No    Intervention:  Niece has POA        Tobacco Use:    Tobacco Use      Smoking status: Every Day        Packs/day: 1.00        Years: 1 pack/day for 61.1 years (61.1 ttl pk-yrs)        Types: Cigarettes        Start date: 1/10/1964      Smokeless tobacco: Never     Interventions:  Patient declined any further intervention or treatment                      Objective   Vision Screening    Right eye Left eye Both eyes   Without correction      With correction 20/30 0 20/25      Vitals:    02/25/25 1404   BP: 134/72   Site: Left Upper Arm   Position: Sitting   Cuff Size: Small Adult   Pulse: 62   Resp: 14   Temp: 97.3 °F

## 2025-02-25 NOTE — PROGRESS NOTES
Joe Norton is a 84 y.o. year old male who presents today for   Chief Complaint   Patient presents with    Medicare AWV        \"Have you been to the ER, urgent care clinic since your last visit?  Hospitalized since your last visit?\"   no      “Have you seen or consulted any other health care providers outside our system since your last visit?”   NO             Christine Urban CMA  [facility]  Ph: [phone]  Fax: [phone]

## 2025-03-20 ENCOUNTER — OFFICE VISIT (OUTPATIENT)
Facility: CLINIC | Age: 85
End: 2025-03-20

## 2025-03-20 VITALS
RESPIRATION RATE: 15 BRPM | HEIGHT: 66 IN | TEMPERATURE: 97.7 F | HEART RATE: 72 BPM | WEIGHT: 120.4 LBS | BODY MASS INDEX: 19.35 KG/M2 | SYSTOLIC BLOOD PRESSURE: 132 MMHG | DIASTOLIC BLOOD PRESSURE: 71 MMHG | OXYGEN SATURATION: 97 %

## 2025-03-20 DIAGNOSIS — F17.200 CURRENT SMOKER: ICD-10-CM

## 2025-03-20 DIAGNOSIS — Z13.1 SCREENING FOR DIABETES MELLITUS: ICD-10-CM

## 2025-03-20 DIAGNOSIS — J41.0 SIMPLE CHRONIC BRONCHITIS (HCC): Primary | ICD-10-CM

## 2025-03-20 DIAGNOSIS — Z12.5 PROSTATE CANCER SCREENING: ICD-10-CM

## 2025-03-20 DIAGNOSIS — R73.03 PREDIABETES: ICD-10-CM

## 2025-03-20 DIAGNOSIS — Z13.220 SCREENING FOR HYPERCHOLESTEROLEMIA: ICD-10-CM

## 2025-03-20 DIAGNOSIS — I10 ESSENTIAL (PRIMARY) HYPERTENSION: ICD-10-CM

## 2025-03-20 PROBLEM — J44.9 COPD (CHRONIC OBSTRUCTIVE PULMONARY DISEASE) (HCC): Status: ACTIVE | Noted: 2024-10-28

## 2025-03-20 RX ORDER — FLUTICASONE FUROATE AND VILANTEROL 100; 25 UG/1; UG/1
1 POWDER RESPIRATORY (INHALATION) DAILY
Qty: 60 EACH | Refills: 5 | Status: SHIPPED | OUTPATIENT
Start: 2025-03-20

## 2025-03-20 RX ORDER — ALBUTEROL SULFATE 90 UG/1
2 INHALANT RESPIRATORY (INHALATION) EVERY 4 HOURS PRN
COMMUNITY
Start: 2024-10-30 | End: 2025-03-20 | Stop reason: SDUPTHER

## 2025-03-20 RX ORDER — ALBUTEROL SULFATE 90 UG/1
2 INHALANT RESPIRATORY (INHALATION) EVERY 4 HOURS PRN
Qty: 18 G | Refills: 5 | Status: SHIPPED | OUTPATIENT
Start: 2025-03-20

## 2025-03-20 RX ORDER — NICOTINE 21 MG/24HR
1 PATCH, TRANSDERMAL 24 HOURS TRANSDERMAL DAILY
Qty: 42 PATCH | Refills: 3 | Status: SHIPPED | OUTPATIENT
Start: 2025-03-20 | End: 2025-09-04

## 2025-03-20 RX ORDER — FLUTICASONE FUROATE AND VILANTEROL 100; 25 UG/1; UG/1
1 POWDER RESPIRATORY (INHALATION) DAILY
COMMUNITY
Start: 2024-10-30 | End: 2025-03-20 | Stop reason: SDUPTHER

## 2025-03-20 NOTE — PATIENT INSTRUCTIONS
[facility]   Phone:[phone]    I have ordered labs. Please try to complete your labs at least one week before your visit, so we can use the results to make sound treatment decisions.

## 2025-03-20 NOTE — PROGRESS NOTES
Joe Norton is a 84 y.o. year old male who presents today for   Chief Complaint   Patient presents with    Follow-up    Joint Pain        \"Have you been to the ER, urgent care clinic since your last visit?  Hospitalized since your last visit?\"   NO      “Have you seen or consulted any other health care providers outside our system since your last visit?”   NO             Christine Urban [facility]  Ph: [phone]  Fax: [phone]  
stiffness  - atraumatic   - had PNA about a month ago  Update (02/25/25)  - CRP 0.7, ESR 80, CCP >250  Update (03/20/25)  - pain improved with steroid course; he did have minor visual hallucinations  - has not scheduled with rheum    Smoker/COPD  - about 1 PPD x 60 y  - interested in quitting; never been screened for lung CA  - denies SOB, cough, unintentional weight loss  Update (03/20/25)  - wa scontrolle don breo and albuterol; needs refills  - no cigs x early March;     ROS as stated above.    /71 (BP Site: Left Upper Arm, Patient Position: Sitting, BP Cuff Size: Small Adult)   Pulse 72   Temp 97.7 °F (36.5 °C) (Temporal)   Resp 15   Ht 1.676 m (5' 6\")   Wt 54.6 kg (120 lb 6.4 oz)   SpO2 97%   BMI 19.43 kg/m²     Physical Exam          An electronic signature was used to authenticate this note.    --Lane Leung MD

## 2025-05-22 ENCOUNTER — TELEPHONE (OUTPATIENT)
Facility: CLINIC | Age: 85
End: 2025-05-22
Payer: MEDICARE

## 2025-05-22 DIAGNOSIS — R91.8 ABNORMAL FINDING ON LUNG IMAGING: Primary | ICD-10-CM

## 2025-05-22 PROCEDURE — 99452 NTRPROF PH1/NTRNET/EHR RFRL: CPT | Performed by: STUDENT IN AN ORGANIZED HEALTH CARE EDUCATION/TRAINING PROGRAM

## 2025-05-22 NOTE — TELEPHONE ENCOUNTER
Spoke with Dr. Romeo, [facility] Rhe[facility]. 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 She also had concern for weight loss; our records show pt has slowly lost 6 pounds over 18 months.    Pt should complete a CT chest w/ contrast ASAP (he is a few months past recommended surveillance CT for 0.8 mm lung nodule, found in 10/28/24) and possible adrenal protocal CT (L adrenal nodule measuring up to 14 mm, found in 10/28/24)    Pt had declined colonoscopy in the past.  We will revisit this when I see him, hopefully next week.    The pt also informed rheum that he had received a PCI in the past, which had not been reported to me; he needs a statin.    I have called pt to notify him of the stated priorities, no answer but I left a VM; I also asked staff to call him to schedule an appointment for next week.    I have spent 21 minutes on chart review, pt communication, medication reconciliation, and insurance requirement review.

## 2025-05-23 ENCOUNTER — TELEPHONE (OUTPATIENT)
Facility: CLINIC | Age: 85
End: 2025-05-23

## 2025-06-13 PROBLEM — E11.9 DIET-CONTROLLED DIABETES MELLITUS (HCC): Status: ACTIVE | Noted: 2024-07-25

## 2025-06-24 ENCOUNTER — OFFICE VISIT (OUTPATIENT)
Facility: CLINIC | Age: 85
End: 2025-06-24
Payer: MEDICARE

## 2025-06-24 VITALS
SYSTOLIC BLOOD PRESSURE: 136 MMHG | HEART RATE: 70 BPM | BODY MASS INDEX: 20.09 KG/M2 | TEMPERATURE: 98 F | HEIGHT: 66 IN | DIASTOLIC BLOOD PRESSURE: 73 MMHG | RESPIRATION RATE: 15 BRPM | WEIGHT: 125 LBS | OXYGEN SATURATION: 98 %

## 2025-06-24 DIAGNOSIS — E11.9 DIET-CONTROLLED DIABETES MELLITUS (HCC): ICD-10-CM

## 2025-06-24 DIAGNOSIS — R91.1 LUNG NODULE: ICD-10-CM

## 2025-06-24 DIAGNOSIS — J41.0 SIMPLE CHRONIC BRONCHITIS (HCC): ICD-10-CM

## 2025-06-24 DIAGNOSIS — Z13.220 SCREENING FOR HYPERCHOLESTEROLEMIA: ICD-10-CM

## 2025-06-24 DIAGNOSIS — I10 ESSENTIAL (PRIMARY) HYPERTENSION: ICD-10-CM

## 2025-06-24 DIAGNOSIS — M25.59 PAIN IN OTHER JOINT: ICD-10-CM

## 2025-06-24 DIAGNOSIS — F17.200 CURRENT SMOKER: Primary | ICD-10-CM

## 2025-06-24 PROCEDURE — G2211 COMPLEX E/M VISIT ADD ON: HCPCS | Performed by: STUDENT IN AN ORGANIZED HEALTH CARE EDUCATION/TRAINING PROGRAM

## 2025-06-24 PROCEDURE — 1126F AMNT PAIN NOTED NONE PRSNT: CPT | Performed by: STUDENT IN AN ORGANIZED HEALTH CARE EDUCATION/TRAINING PROGRAM

## 2025-06-24 PROCEDURE — 3078F DIAST BP <80 MM HG: CPT | Performed by: STUDENT IN AN ORGANIZED HEALTH CARE EDUCATION/TRAINING PROGRAM

## 2025-06-24 PROCEDURE — G8428 CUR MEDS NOT DOCUMENT: HCPCS | Performed by: STUDENT IN AN ORGANIZED HEALTH CARE EDUCATION/TRAINING PROGRAM

## 2025-06-24 PROCEDURE — 4004F PT TOBACCO SCREEN RCVD TLK: CPT | Performed by: STUDENT IN AN ORGANIZED HEALTH CARE EDUCATION/TRAINING PROGRAM

## 2025-06-24 PROCEDURE — 3023F SPIROM DOC REV: CPT | Performed by: STUDENT IN AN ORGANIZED HEALTH CARE EDUCATION/TRAINING PROGRAM

## 2025-06-24 PROCEDURE — 99215 OFFICE O/P EST HI 40 MIN: CPT | Performed by: STUDENT IN AN ORGANIZED HEALTH CARE EDUCATION/TRAINING PROGRAM

## 2025-06-24 PROCEDURE — 3075F SYST BP GE 130 - 139MM HG: CPT | Performed by: STUDENT IN AN ORGANIZED HEALTH CARE EDUCATION/TRAINING PROGRAM

## 2025-06-24 PROCEDURE — 1123F ACP DISCUSS/DSCN MKR DOCD: CPT | Performed by: STUDENT IN AN ORGANIZED HEALTH CARE EDUCATION/TRAINING PROGRAM

## 2025-06-24 PROCEDURE — G8420 CALC BMI NORM PARAMETERS: HCPCS | Performed by: STUDENT IN AN ORGANIZED HEALTH CARE EDUCATION/TRAINING PROGRAM

## 2025-06-24 RX ORDER — VARENICLINE TARTRATE 0.5 MG/1
.5-1 TABLET, FILM COATED ORAL SEE ADMIN INSTRUCTIONS
Qty: 57 TABLET | Refills: 0 | Status: SHIPPED | OUTPATIENT
Start: 2025-06-24

## 2025-06-24 SDOH — ECONOMIC STABILITY: FOOD INSECURITY: WITHIN THE PAST 12 MONTHS, THE FOOD YOU BOUGHT JUST DIDN'T LAST AND YOU DIDN'T HAVE MONEY TO GET MORE.: NEVER TRUE

## 2025-06-24 SDOH — ECONOMIC STABILITY: FOOD INSECURITY: WITHIN THE PAST 12 MONTHS, YOU WORRIED THAT YOUR FOOD WOULD RUN OUT BEFORE YOU GOT MONEY TO BUY MORE.: NEVER TRUE

## 2025-06-24 ASSESSMENT — PATIENT HEALTH QUESTIONNAIRE - PHQ9
SUM OF ALL RESPONSES TO PHQ QUESTIONS 1-9: 0
1. LITTLE INTEREST OR PLEASURE IN DOING THINGS: NOT AT ALL
2. FEELING DOWN, DEPRESSED OR HOPELESS: NOT AT ALL

## 2025-06-24 ASSESSMENT — ENCOUNTER SYMPTOMS: SHORTNESS OF BREATH: 1

## 2025-06-24 NOTE — PROGRESS NOTES
Joe Norton (:  1940) is a 84 y.o. male here for evaluation of the following chief complaint(s):  Shortness of Breath        ASSESSMENT/PLAN:  1. Current smoker  Assessment & Plan:  - returned to a few cigs daily  - discussed cessation for 12 minutes   - trial of chantix low dose      Orders:  -     varenicline (CHANTIX) 0.5 MG tablet; Take 1-2 tablets by mouth See Admin Instructions 0.5mg DAILY for 3 days followed by 0.5mg TWICE DAILY for 4 days followed by 1mg TWICE DAILY, Disp-57 tablet, R-0Normal  2. Diet-controlled diabetes mellitus (HCC)  -     Hemoglobin A1C; Future  -     CBC; Future  3. Simple chronic bronchitis (HCC)  -     Comprehensive Metabolic Panel; Future  -     CBC; Future  4. Pain in other joint  Assessment & Plan:  - improved  - cont to see rhuem   5. Screening for hypercholesterolemia  -     Lipid Panel; Future  6. Essential (primary) hypertension  Assessment & Plan:  - controlled, no adverse medication SE, continue meds   Orders:  -     Comprehensive Metabolic Panel; Future  -     CBC; Future  7. Lung nodule  Assessment & Plan:  - minor SOB  - pt to schedule CT chest w/ contrast at [facility]       Follow-up and Dispositions    Return in about 2 weeks (around 2025).         SUBJECTIVE/OBJECTIVE:  Shortness of Breath      Smoker/COPD  - about 1 PPD x 60 y  - interested in quitting; never been screened for lung CA  - denies SOB, cough, unintentional weight loss  Update (25)  - wa scontrolle don breo and albuterol; needs refills  - no cigs x early March  Update (25)  - smoking off and on  - wearing patch; the gum makes him nauseous   - declines medications    Arthralgia  - worsening severe pain in bilateral wrists and shoulders x 2 weeks  - pain is worse in the AM along with stiffness  - atraumatic   - had PNA about a month ago  Update (25)  - CRP 0.7, ESR 80, CCP >250  Update (25)  - pain improved with steroid course; he did have minor visual hallucinations  -

## 2025-06-24 NOTE — ASSESSMENT & PLAN NOTE
- returned to a few cigs daily  - discussed cessation for 12 minutes   - trial of chantix low dose

## 2025-06-24 NOTE — PROGRESS NOTES
Joe Norton is a 84 y.o. year old male who presents today for   Chief Complaint   Patient presents with    Shortness of Breath       \"Have you been to the ER, urgent care clinic since your last visit?  Hospitalized since your last visit?\"    no    “Have you seen or consulted any other health care providers outside our system since your last visit?”    no          Click Here for Release of Records Request    - MARTÍN Smith  [facility]  Phone: [phone]  Fax: [phone]

## 2025-06-24 NOTE — PATIENT INSTRUCTIONS
I have ordered labs. Please try to complete your labs at least one week before your visit, so we can use the results to make sound treatment decisions.     Please call [phone] to schedule your imaging with [facility].  We have to fax the order first, so wait a day or two to call.

## 2025-07-04 LAB
ALBUMIN SERPL-MCNC: 4.3 G/DL (ref 3.7–4.7)
ALP SERPL-CCNC: 95 IU/L (ref 44–121)
ALT SERPL-CCNC: 22 IU/L (ref 0–44)
AST SERPL-CCNC: 27 IU/L (ref 0–40)
BASOPHILS # BLD AUTO: 0.1 X10E3/UL (ref 0–0.2)
BASOPHILS NFR BLD AUTO: 1 %
BILIRUB SERPL-MCNC: 0.5 MG/DL (ref 0–1.2)
BUN SERPL-MCNC: 20 MG/DL (ref 8–27)
BUN/CREAT SERPL: 25 (ref 10–24)
CALCIUM SERPL-MCNC: 10.3 MG/DL (ref 8.6–10.2)
CHLORIDE SERPL-SCNC: 103 MMOL/L (ref 96–106)
CHOLEST SERPL-MCNC: 161 MG/DL (ref 100–199)
CO2 SERPL-SCNC: 21 MMOL/L (ref 20–29)
CREAT SERPL-MCNC: 0.81 MG/DL (ref 0.76–1.27)
EGFRCR SERPLBLD CKD-EPI 2021: 86 ML/MIN/1.73
EOSINOPHIL # BLD AUTO: 0.2 X10E3/UL (ref 0–0.4)
EOSINOPHIL NFR BLD AUTO: 2 %
ERYTHROCYTE [DISTWIDTH] IN BLOOD BY AUTOMATED COUNT: 15.5 % (ref 11.6–15.4)
GLOBULIN SER CALC-MCNC: 3.1 G/DL (ref 1.5–4.5)
GLUCOSE SERPL-MCNC: 77 MG/DL (ref 70–99)
HBA1C MFR BLD: 5.9 % (ref 4.8–5.6)
HCT VFR BLD AUTO: 35.7 % (ref 37.5–51)
HDLC SERPL-MCNC: 48 MG/DL
HGB BLD-MCNC: 11.8 G/DL (ref 13–17.7)
IMM GRANULOCYTES # BLD AUTO: 0 X10E3/UL (ref 0–0.1)
IMM GRANULOCYTES NFR BLD AUTO: 0 %
LDLC SERPL CALC-MCNC: 91 MG/DL (ref 0–99)
LYMPHOCYTES # BLD AUTO: 1.5 X10E3/UL (ref 0.7–3.1)
LYMPHOCYTES NFR BLD AUTO: 16 %
MCH RBC QN AUTO: 31.9 PG (ref 26.6–33)
MCHC RBC AUTO-ENTMCNC: 33.1 G/DL (ref 31.5–35.7)
MCV RBC AUTO: 97 FL (ref 79–97)
MONOCYTES # BLD AUTO: 0.3 X10E3/UL (ref 0.1–0.9)
MONOCYTES NFR BLD AUTO: 4 %
NEUTROPHILS # BLD AUTO: 7.1 X10E3/UL (ref 1.4–7)
NEUTROPHILS NFR BLD AUTO: 77 %
PLATELET # BLD AUTO: 297 X10E3/UL (ref 150–450)
POTASSIUM SERPL-SCNC: 4.3 MMOL/L (ref 3.5–5.2)
PROT SERPL-MCNC: 7.4 G/DL (ref 6–8.5)
RBC # BLD AUTO: 3.7 X10E6/UL (ref 4.14–5.8)
SODIUM SERPL-SCNC: 138 MMOL/L (ref 134–144)
TRIGL SERPL-MCNC: 124 MG/DL (ref 0–149)
VLDLC SERPL CALC-MCNC: 22 MG/DL (ref 5–40)
WBC # BLD AUTO: 9.2 X10E3/UL (ref 3.4–10.8)

## 2025-07-15 ENCOUNTER — OFFICE VISIT (OUTPATIENT)
Facility: CLINIC | Age: 85
End: 2025-07-15
Payer: MEDICARE

## 2025-07-15 VITALS
TEMPERATURE: 97.6 F | OXYGEN SATURATION: 94 % | SYSTOLIC BLOOD PRESSURE: 112 MMHG | HEIGHT: 66 IN | RESPIRATION RATE: 15 BRPM | DIASTOLIC BLOOD PRESSURE: 68 MMHG | BODY MASS INDEX: 20.25 KG/M2 | WEIGHT: 126 LBS | HEART RATE: 80 BPM

## 2025-07-15 DIAGNOSIS — F17.200 CURRENT SMOKER: ICD-10-CM

## 2025-07-15 DIAGNOSIS — M06.041 RHEUMATOID ARTHRITIS INVOLVING BOTH HANDS WITH NEGATIVE RHEUMATOID FACTOR (HCC): ICD-10-CM

## 2025-07-15 DIAGNOSIS — D64.9 ANEMIA, UNSPECIFIED TYPE: Primary | ICD-10-CM

## 2025-07-15 DIAGNOSIS — M06.042 RHEUMATOID ARTHRITIS INVOLVING BOTH HANDS WITH NEGATIVE RHEUMATOID FACTOR (HCC): ICD-10-CM

## 2025-07-15 PROCEDURE — 1126F AMNT PAIN NOTED NONE PRSNT: CPT | Performed by: STUDENT IN AN ORGANIZED HEALTH CARE EDUCATION/TRAINING PROGRAM

## 2025-07-15 PROCEDURE — G8420 CALC BMI NORM PARAMETERS: HCPCS | Performed by: STUDENT IN AN ORGANIZED HEALTH CARE EDUCATION/TRAINING PROGRAM

## 2025-07-15 PROCEDURE — 3078F DIAST BP <80 MM HG: CPT | Performed by: STUDENT IN AN ORGANIZED HEALTH CARE EDUCATION/TRAINING PROGRAM

## 2025-07-15 PROCEDURE — 1123F ACP DISCUSS/DSCN MKR DOCD: CPT | Performed by: STUDENT IN AN ORGANIZED HEALTH CARE EDUCATION/TRAINING PROGRAM

## 2025-07-15 PROCEDURE — 3074F SYST BP LT 130 MM HG: CPT | Performed by: STUDENT IN AN ORGANIZED HEALTH CARE EDUCATION/TRAINING PROGRAM

## 2025-07-15 PROCEDURE — G8427 DOCREV CUR MEDS BY ELIG CLIN: HCPCS | Performed by: STUDENT IN AN ORGANIZED HEALTH CARE EDUCATION/TRAINING PROGRAM

## 2025-07-15 PROCEDURE — G2211 COMPLEX E/M VISIT ADD ON: HCPCS | Performed by: STUDENT IN AN ORGANIZED HEALTH CARE EDUCATION/TRAINING PROGRAM

## 2025-07-15 PROCEDURE — 1159F MED LIST DOCD IN RCRD: CPT | Performed by: STUDENT IN AN ORGANIZED HEALTH CARE EDUCATION/TRAINING PROGRAM

## 2025-07-15 PROCEDURE — 99215 OFFICE O/P EST HI 40 MIN: CPT | Performed by: STUDENT IN AN ORGANIZED HEALTH CARE EDUCATION/TRAINING PROGRAM

## 2025-07-15 PROCEDURE — 4004F PT TOBACCO SCREEN RCVD TLK: CPT | Performed by: STUDENT IN AN ORGANIZED HEALTH CARE EDUCATION/TRAINING PROGRAM

## 2025-07-15 SDOH — ECONOMIC STABILITY: FOOD INSECURITY: WITHIN THE PAST 12 MONTHS, YOU WORRIED THAT YOUR FOOD WOULD RUN OUT BEFORE YOU GOT MONEY TO BUY MORE.: NEVER TRUE

## 2025-07-15 SDOH — ECONOMIC STABILITY: FOOD INSECURITY: WITHIN THE PAST 12 MONTHS, THE FOOD YOU BOUGHT JUST DIDN'T LAST AND YOU DIDN'T HAVE MONEY TO GET MORE.: NEVER TRUE

## 2025-07-15 ASSESSMENT — PATIENT HEALTH QUESTIONNAIRE - PHQ9
1. LITTLE INTEREST OR PLEASURE IN DOING THINGS: NOT AT ALL
2. FEELING DOWN, DEPRESSED OR HOPELESS: NOT AT ALL
SUM OF ALL RESPONSES TO PHQ QUESTIONS 1-9: 0

## 2025-07-15 NOTE — PROGRESS NOTES
Joe Norton (:  1940) is a 84 y.o. male here for evaluation of the following chief complaint(s):  Discuss Labs        ASSESSMENT/PLAN:  1. Anemia, unspecified type  Assessment & Plan:  - possible chronic blood loss, asx  - update labs in 3 months   Orders:  -     Ferritin; Future  -     Transferrin; Future  -     Iron and TIBC; Future  -     Vitamin B12 & Folate; Future  -     CBC with Auto Differential; Future  2. Rheumatoid arthritis involving both hands with negative rheumatoid factor (HCC)  Assessment & Plan:  - controlled on PRN prednisone   3. Current smoker  Assessment & Plan:  - discussed smoking cessation and associated risks for 11 minutes   - plans to start chantix when he is ready   - will see pt after he does LD lung CT      Follow-up and Dispositions    Return in about 3 weeks (around 2025) for lung scan fu, and 3 month appt for anemia.         SUBJECTIVE/OBJECTIVE:  HPI  Smoker/COPD  - about 1 PPD x 60 y  - interested in quitting; never been screened for lung CA  - denies SOB, cough, unintentional weight loss  Update (25)  - wa scontrolle don breo and albuterol; needs refills  - no cigs x early March  Update (25)  - smoking off and on  - wearing patch; the gum makes him nauseous   - declines medications  Update (25)  - plans to start chantix when he is ready     Arthralgia  - worsening severe pain in bilateral wrists and shoulders x 2 weeks  - pain is worse in the AM along with stiffness  - atraumatic   - had PNA about a month ago  Update (25)  - CRP 0.7, ESR 80, CCP >250  Update (25)  - pain improved with steroid course; he did have minor visual hallucinations  - has not scheduled with rheum  Update (25)  - saw rheum; on folic acid and PRN low dose prednisone which helps  - fu with rheum tomorrow  Update (25)  - seeing rheum; PRN prednisone; sxs much better controlled    Smoker/COPD  - about 1 PPD x 60 y  - interested in quitting; never been

## 2025-07-15 NOTE — PROGRESS NOTES
Joe Norton is a 84 y.o. year old male who presents today for   Chief Complaint   Patient presents with    Discuss Labs        \"Have you been to the ER, urgent care clinic since your last visit?  Hospitalized since your last visit?\"   NO     “Have you seen or consulted any other health care providers outside our system since your last visit?”   NO             - Gladys Stover/BRIGETTE Alcala  [facility]  Phone: [phone]  Fax: [phone]

## 2025-07-16 PROBLEM — M06.9 RHEUMATOID ARTHRITIS INVOLVING BOTH HANDS (HCC): Status: ACTIVE | Noted: 2025-01-21

## 2025-07-16 PROBLEM — M06.042 RHEUMATOID ARTHRITIS INVOLVING BOTH HANDS WITH NEGATIVE RHEUMATOID FACTOR (HCC): Status: ACTIVE | Noted: 2025-01-21

## 2025-07-16 PROBLEM — D64.9 ANEMIA: Status: ACTIVE | Noted: 2025-07-16

## 2025-07-16 PROBLEM — M06.041 RHEUMATOID ARTHRITIS INVOLVING BOTH HANDS WITH NEGATIVE RHEUMATOID FACTOR (HCC): Status: ACTIVE | Noted: 2025-01-21

## 2025-07-16 NOTE — ASSESSMENT & PLAN NOTE
- discussed smoking cessation and associated risks for 11 minutes   - plans to start chantix when he is ready   - will see pt after he does LD lung CT

## 2025-08-04 DIAGNOSIS — F17.200 CURRENT SMOKER: ICD-10-CM

## 2025-08-04 RX ORDER — VARENICLINE TARTRATE 0.5 MG/1
.5-1 TABLET, FILM COATED ORAL SEE ADMIN INSTRUCTIONS
Qty: 57 TABLET | Refills: 0 | OUTPATIENT
Start: 2025-08-04

## 2025-08-04 RX ORDER — VARENICLINE TARTRATE 1 MG/1
1 TABLET, FILM COATED ORAL 2 TIMES DAILY
Qty: 180 TABLET | Refills: 1 | Status: SHIPPED | OUTPATIENT
Start: 2025-08-04

## 2025-08-05 ENCOUNTER — OFFICE VISIT (OUTPATIENT)
Facility: CLINIC | Age: 85
End: 2025-08-05
Payer: MEDICARE

## 2025-08-05 VITALS
TEMPERATURE: 97.4 F | SYSTOLIC BLOOD PRESSURE: 166 MMHG | HEIGHT: 66 IN | OXYGEN SATURATION: 94 % | RESPIRATION RATE: 18 BRPM | HEART RATE: 66 BPM | DIASTOLIC BLOOD PRESSURE: 75 MMHG | WEIGHT: 124.4 LBS | BODY MASS INDEX: 19.99 KG/M2

## 2025-08-05 DIAGNOSIS — F17.200 CURRENT SMOKER: Primary | ICD-10-CM

## 2025-08-05 DIAGNOSIS — R91.1 SOLID NODULE OF LUNG GREATER THAN 8 MM IN DIAMETER: ICD-10-CM

## 2025-08-05 PROCEDURE — 1123F ACP DISCUSS/DSCN MKR DOCD: CPT | Performed by: STUDENT IN AN ORGANIZED HEALTH CARE EDUCATION/TRAINING PROGRAM

## 2025-08-05 PROCEDURE — G8427 DOCREV CUR MEDS BY ELIG CLIN: HCPCS | Performed by: STUDENT IN AN ORGANIZED HEALTH CARE EDUCATION/TRAINING PROGRAM

## 2025-08-05 PROCEDURE — 4004F PT TOBACCO SCREEN RCVD TLK: CPT | Performed by: STUDENT IN AN ORGANIZED HEALTH CARE EDUCATION/TRAINING PROGRAM

## 2025-08-05 PROCEDURE — G2211 COMPLEX E/M VISIT ADD ON: HCPCS | Performed by: STUDENT IN AN ORGANIZED HEALTH CARE EDUCATION/TRAINING PROGRAM

## 2025-08-05 PROCEDURE — 3077F SYST BP >= 140 MM HG: CPT | Performed by: STUDENT IN AN ORGANIZED HEALTH CARE EDUCATION/TRAINING PROGRAM

## 2025-08-05 PROCEDURE — 1159F MED LIST DOCD IN RCRD: CPT | Performed by: STUDENT IN AN ORGANIZED HEALTH CARE EDUCATION/TRAINING PROGRAM

## 2025-08-05 PROCEDURE — 99215 OFFICE O/P EST HI 40 MIN: CPT | Performed by: STUDENT IN AN ORGANIZED HEALTH CARE EDUCATION/TRAINING PROGRAM

## 2025-08-05 PROCEDURE — 3078F DIAST BP <80 MM HG: CPT | Performed by: STUDENT IN AN ORGANIZED HEALTH CARE EDUCATION/TRAINING PROGRAM

## 2025-08-05 PROCEDURE — G8420 CALC BMI NORM PARAMETERS: HCPCS | Performed by: STUDENT IN AN ORGANIZED HEALTH CARE EDUCATION/TRAINING PROGRAM

## 2025-08-05 PROCEDURE — 1125F AMNT PAIN NOTED PAIN PRSNT: CPT | Performed by: STUDENT IN AN ORGANIZED HEALTH CARE EDUCATION/TRAINING PROGRAM

## 2025-08-05 ASSESSMENT — PATIENT HEALTH QUESTIONNAIRE - PHQ9
1. LITTLE INTEREST OR PLEASURE IN DOING THINGS: NOT AT ALL
SUM OF ALL RESPONSES TO PHQ QUESTIONS 1-9: 0
2. FEELING DOWN, DEPRESSED OR HOPELESS: NOT AT ALL

## (undated) DEVICE — STERILE LATEX POWDER-FREE SURGICAL GLOVESWITH NITRILE COATING: Brand: PROTEXIS

## (undated) DEVICE — STOCKINETTE IMPERV REG 9X48IN --

## (undated) DEVICE — ABDOMINAL PAD: Brand: DERMACEA

## (undated) DEVICE — BLADE SAW OSC 19.5X63X0.6MM --

## (undated) DEVICE — SUTURE FIBERWIRE 2-0 L18IN NONABSORBABLE BLU L17.9MM 3/8 AR7220

## (undated) DEVICE — SUTURE ETHBND EXCEL SZ 5 L30IN NONABSORBABLE GRN L40MM V-37 MB66G

## (undated) DEVICE — SOL IRRIGATION INJ NACL 0.9% 500ML BTL

## (undated) DEVICE — SOL IRR SOD CL 0.9% 3000ML --

## (undated) DEVICE — INTENDED FOR TISSUE SEPARATION, AND OTHER PROCEDURES THAT REQUIRE A SHARP SURGICAL BLADE TO PUNCTURE OR CUT.: Brand: BARD-PARKER SAFETY BLADES SIZE 10, STERILE

## (undated) DEVICE — 3M™ STERI-DRAPE™ U-DRAPE 1015: Brand: STERI-DRAPE™

## (undated) DEVICE — TOWEL: OR BLU 80/CS: Brand: MEDICAL ACTION INDUSTRIES

## (undated) DEVICE — STAPLER SKIN H3.9MM WIRE DIA0.58MM CRWN 6.9MM 35 STPL FIX

## (undated) DEVICE — DRAPE,U/ SHT,SPLIT,PLAS,STERIL: Brand: MEDLINE

## (undated) DEVICE — OCCLUSIVE GAUZE STRIP,3% BISMUTH TRIBROMOPHENATE IN PETROLATUM BLEND: Brand: XEROFORM

## (undated) DEVICE — SUTURE VCRL SZ 0 L18IN ABSRB VLT L36MM CT-1 1/2 CIR J740D

## (undated) DEVICE — SUTURE VCRL SZ 1 L36IN ABSRB VLT L36MM CT-1 1/2 CIR J347H

## (undated) DEVICE — INTENDED FOR TISSUE SEPARATION, AND OTHER PROCEDURES THAT REQUIRE A SHARP SURGICAL BLADE TO PUNCTURE OR CUT.: Brand: BARD-PARKER ® STAINLESS STEEL BLADES

## (undated) DEVICE — GOWN,SIRUS,NONRNF,SETINSLV,2XL,18/CS: Brand: MEDLINE

## (undated) DEVICE — 3M™ IOBAN™ 2 ANTIMICROBIAL INCISE DRAPE 6651EZ: Brand: IOBAN™ 2

## (undated) DEVICE — REM POLYHESIVE ADULT PATIENT RETURN ELECTRODE: Brand: VALLEYLAB

## (undated) DEVICE — DRAPE,HIP,W/POUCHES,STERILE: Brand: MEDLINE

## (undated) DEVICE — BLADE SAW W0.98XL3.54IN THK0.05IN CUT THK0.05IN SAG

## (undated) DEVICE — KENDALL SCD EXPRESS SLEEVES, KNEE LENGTH, MEDIUM: Brand: KENDALL SCD

## (undated) DEVICE — DRAPE,REIN 53X77,STERILE: Brand: MEDLINE

## (undated) DEVICE — PAD,ABDOMINAL,5"X9",STERILE,LF,1/PK: Brand: MEDLINE INDUSTRIES, INC.

## (undated) DEVICE — STERILE POLYISOPRENE POWDER-FREE SURGICAL GLOVES: Brand: PROTEXIS

## (undated) DEVICE — SUTURE VCRL SZ 2-0 L27IN ABSRB UD L26MM CT-2 1/2 CIR J269H

## (undated) DEVICE — ABDUCTION PILLOW,MEDIUM: Brand: DEVON

## (undated) DEVICE — HANDPIECE SET WITH SOFT TISSUE TIP AND SUCTION TUBE: Brand: INTERPULSE

## (undated) DEVICE — KIT PROC TOT HIP CUST LF STRL --